# Patient Record
Sex: FEMALE | Race: WHITE | NOT HISPANIC OR LATINO | URBAN - METROPOLITAN AREA
[De-identification: names, ages, dates, MRNs, and addresses within clinical notes are randomized per-mention and may not be internally consistent; named-entity substitution may affect disease eponyms.]

---

## 2021-01-04 ENCOUNTER — OUT OF OFFICE VISIT (OUTPATIENT)
Dept: URBAN - METROPOLITAN AREA MEDICAL CENTER 10 | Facility: MEDICAL CENTER | Age: 51
End: 2021-01-04
Payer: COMMERCIAL

## 2021-01-04 DIAGNOSIS — R19.7 ACUTE DIARRHEA: ICD-10-CM

## 2021-01-04 DIAGNOSIS — B37.81 CANDIDA ESOPHAGITIS: ICD-10-CM

## 2021-01-04 DIAGNOSIS — R14.3 EXCESSIVE FLATUS: ICD-10-CM

## 2021-01-04 DIAGNOSIS — R93.3 ABN FINDINGS-GI TRACT: ICD-10-CM

## 2021-01-04 DIAGNOSIS — R11.2 ACUTE NAUSEA WITH NONBILIOUS VOMITING: ICD-10-CM

## 2021-01-04 PROCEDURE — 99231 SBSQ HOSP IP/OBS SF/LOW 25: CPT | Performed by: INTERNAL MEDICINE

## 2021-01-04 PROCEDURE — 99223 1ST HOSP IP/OBS HIGH 75: CPT | Performed by: INTERNAL MEDICINE

## 2021-01-04 PROCEDURE — G8427 DOCREV CUR MEDS BY ELIG CLIN: HCPCS | Performed by: INTERNAL MEDICINE

## 2021-01-05 ENCOUNTER — OUT OF OFFICE VISIT (OUTPATIENT)
Dept: URBAN - METROPOLITAN AREA MEDICAL CENTER 10 | Facility: MEDICAL CENTER | Age: 51
End: 2021-01-05
Payer: COMMERCIAL

## 2021-01-05 DIAGNOSIS — R14.0 ABDOMINAL BLOATING: ICD-10-CM

## 2021-01-05 DIAGNOSIS — R93.3 ABN FINDINGS-GI TRACT: ICD-10-CM

## 2021-01-05 DIAGNOSIS — B37.81 CANDIDA ESOPHAGITIS: ICD-10-CM

## 2021-01-05 PROCEDURE — 43239 EGD BIOPSY SINGLE/MULTIPLE: CPT | Performed by: INTERNAL MEDICINE

## 2021-01-05 PROCEDURE — 45330 DIAGNOSTIC SIGMOIDOSCOPY: CPT | Performed by: INTERNAL MEDICINE

## 2021-01-28 ENCOUNTER — OUT OF OFFICE VISIT (OUTPATIENT)
Dept: URBAN - METROPOLITAN AREA MEDICAL CENTER 10 | Facility: MEDICAL CENTER | Age: 51
End: 2021-01-28
Payer: COMMERCIAL

## 2021-01-28 DIAGNOSIS — R10.13 ABDOMINAL DISCOMFORT, EPIGASTRIC: ICD-10-CM

## 2021-01-28 DIAGNOSIS — R93.3 ABN FINDINGS-GI TRACT: ICD-10-CM

## 2021-01-28 DIAGNOSIS — R11.2 ACUTE NAUSEA WITH NONBILIOUS VOMITING: ICD-10-CM

## 2021-01-28 DIAGNOSIS — Z98.84 BARIATRIC SURG STAT-UNSP: ICD-10-CM

## 2021-01-28 PROCEDURE — G8427 DOCREV CUR MEDS BY ELIG CLIN: HCPCS | Performed by: INTERNAL MEDICINE

## 2021-01-28 PROCEDURE — 99223 1ST HOSP IP/OBS HIGH 75: CPT | Performed by: INTERNAL MEDICINE

## 2021-01-29 ENCOUNTER — OUT OF OFFICE VISIT (OUTPATIENT)
Dept: URBAN - METROPOLITAN AREA MEDICAL CENTER 10 | Facility: MEDICAL CENTER | Age: 51
End: 2021-01-29
Payer: COMMERCIAL

## 2021-01-29 DIAGNOSIS — R10.84 ABDOMINAL CRAMPING, GENERALIZED: ICD-10-CM

## 2021-01-29 DIAGNOSIS — K59.09 CHRONIC CONSTIPATION: ICD-10-CM

## 2021-01-29 DIAGNOSIS — R93.3 ABN FINDINGS-GI TRACT: ICD-10-CM

## 2021-01-29 PROCEDURE — 99232 SBSQ HOSP IP/OBS MODERATE 35: CPT | Performed by: INTERNAL MEDICINE

## 2021-01-30 ENCOUNTER — OUT OF OFFICE VISIT (OUTPATIENT)
Dept: URBAN - METROPOLITAN AREA MEDICAL CENTER 10 | Facility: MEDICAL CENTER | Age: 51
End: 2021-01-30
Payer: COMMERCIAL

## 2021-01-30 DIAGNOSIS — R93.3 ABN FINDINGS-GI TRACT: ICD-10-CM

## 2021-01-30 DIAGNOSIS — R10.84 ABDOMINAL CRAMPING, GENERALIZED: ICD-10-CM

## 2021-01-30 DIAGNOSIS — K59.09 CHRONIC CONSTIPATION: ICD-10-CM

## 2021-01-30 PROCEDURE — 99232 SBSQ HOSP IP/OBS MODERATE 35: CPT | Performed by: INTERNAL MEDICINE

## 2021-02-01 ENCOUNTER — OFFICE VISIT (OUTPATIENT)
Dept: URBAN - NONMETROPOLITAN AREA CLINIC 4 | Facility: CLINIC | Age: 51
End: 2021-02-01

## 2021-02-01 RX ORDER — PANTOPRAZOLE SODIUM 40 MG/1
TAKE 1 TABLET (40 MG) BY ORAL ROUTE ONCE DAILY TABLET, DELAYED RELEASE ORAL 1
Qty: 0 | Refills: 0 | COMMUNITY
Start: 1900-01-01

## 2021-02-01 RX ORDER — VERAPAMIL HYDROCHLORIDE 120 MG/1
TABLET, FILM COATED ORAL
Qty: 0 | Refills: 0 | COMMUNITY
Start: 1900-01-01

## 2021-02-01 RX ORDER — TOPIRAMATE 50 MG/1
TABLET, FILM COATED ORAL
Qty: 0 | Refills: 0 | COMMUNITY
Start: 1900-01-01

## 2021-02-01 RX ORDER — CLONAZEPAM 1 MG/1
TABLET ORAL
Qty: 0 | Refills: 0 | COMMUNITY
Start: 1900-01-01

## 2021-02-01 RX ORDER — ELETRIPTAN HYDROBROMIDE 40 MG/1
TABLET, FILM COATED ORAL
Qty: 0 | Refills: 0 | COMMUNITY
Start: 1900-01-01

## 2021-02-01 RX ORDER — GABAPENTIN 300 MG/1
CAPSULE ORAL
Qty: 0 | Refills: 0 | COMMUNITY
Start: 1900-01-01

## 2021-02-18 ENCOUNTER — OUT OF OFFICE VISIT (OUTPATIENT)
Dept: URBAN - METROPOLITAN AREA MEDICAL CENTER 10 | Facility: MEDICAL CENTER | Age: 51
End: 2021-02-18
Payer: COMMERCIAL

## 2021-02-18 DIAGNOSIS — Z98.84 BARIATRIC SURG STAT-UNSP: ICD-10-CM

## 2021-02-18 DIAGNOSIS — R10.11 ABDOMINAL BURNING SENSATION IN RIGHT UPPER QUADRANT: ICD-10-CM

## 2021-02-18 DIAGNOSIS — R93.3 ABN FINDINGS-GI TRACT: ICD-10-CM

## 2021-02-18 DIAGNOSIS — R11.2 ACUTE NAUSEA WITH NONBILIOUS VOMITING: ICD-10-CM

## 2021-02-18 PROCEDURE — G8427 DOCREV CUR MEDS BY ELIG CLIN: HCPCS | Performed by: INTERNAL MEDICINE

## 2021-02-18 PROCEDURE — 99222 1ST HOSP IP/OBS MODERATE 55: CPT | Performed by: INTERNAL MEDICINE

## 2021-02-19 ENCOUNTER — OUT OF OFFICE VISIT (OUTPATIENT)
Dept: URBAN - METROPOLITAN AREA MEDICAL CENTER 10 | Facility: MEDICAL CENTER | Age: 51
End: 2021-02-19
Payer: COMMERCIAL

## 2021-02-19 DIAGNOSIS — R10.84 ABDOMINAL CRAMPING, GENERALIZED: ICD-10-CM

## 2021-02-19 DIAGNOSIS — R93.3 ABN FINDINGS-GI TRACT: ICD-10-CM

## 2021-02-19 DIAGNOSIS — R11.2 ACUTE NAUSEA WITH NONBILIOUS VOMITING: ICD-10-CM

## 2021-02-19 DIAGNOSIS — Z98.84 BARIATRIC SURG STAT-UNSP: ICD-10-CM

## 2021-02-19 PROCEDURE — 99232 SBSQ HOSP IP/OBS MODERATE 35: CPT | Performed by: INTERNAL MEDICINE

## 2021-02-20 ENCOUNTER — OUT OF OFFICE VISIT (OUTPATIENT)
Dept: URBAN - METROPOLITAN AREA MEDICAL CENTER 10 | Facility: MEDICAL CENTER | Age: 51
End: 2021-02-20
Payer: COMMERCIAL

## 2021-02-20 DIAGNOSIS — R93.3 ABN FINDINGS-GI TRACT: ICD-10-CM

## 2021-02-20 DIAGNOSIS — R10.84 ABDOMINAL CRAMPING, GENERALIZED: ICD-10-CM

## 2021-02-20 PROCEDURE — 99232 SBSQ HOSP IP/OBS MODERATE 35: CPT | Performed by: INTERNAL MEDICINE

## 2021-03-10 ENCOUNTER — OUT OF OFFICE VISIT (OUTPATIENT)
Dept: URBAN - METROPOLITAN AREA MEDICAL CENTER 10 | Facility: MEDICAL CENTER | Age: 51
End: 2021-03-10
Payer: COMMERCIAL

## 2021-03-10 DIAGNOSIS — R10.13 ABDOMINAL DISCOMFORT, EPIGASTRIC: ICD-10-CM

## 2021-03-10 DIAGNOSIS — R11.2 ACUTE NAUSEA WITH NONBILIOUS VOMITING: ICD-10-CM

## 2021-03-10 DIAGNOSIS — R93.3 ABN FINDINGS-GI TRACT: ICD-10-CM

## 2021-03-10 PROCEDURE — 99232 SBSQ HOSP IP/OBS MODERATE 35: CPT | Performed by: INTERNAL MEDICINE

## 2021-03-10 PROCEDURE — 99222 1ST HOSP IP/OBS MODERATE 55: CPT | Performed by: INTERNAL MEDICINE

## 2021-03-10 PROCEDURE — G8427 DOCREV CUR MEDS BY ELIG CLIN: HCPCS | Performed by: INTERNAL MEDICINE

## 2021-03-12 ENCOUNTER — OUT OF OFFICE VISIT (OUTPATIENT)
Dept: URBAN - METROPOLITAN AREA MEDICAL CENTER 10 | Facility: MEDICAL CENTER | Age: 51
End: 2021-03-12
Payer: COMMERCIAL

## 2021-03-12 DIAGNOSIS — R10.84 ABDOMINAL CRAMPING, GENERALIZED: ICD-10-CM

## 2021-03-12 DIAGNOSIS — R93.3 ABN FINDINGS-GI TRACT: ICD-10-CM

## 2021-03-12 DIAGNOSIS — R11.0 CHRONIC NAUSEA: ICD-10-CM

## 2021-03-12 PROCEDURE — 99233 SBSQ HOSP IP/OBS HIGH 50: CPT | Performed by: INTERNAL MEDICINE

## 2021-03-13 ENCOUNTER — OUT OF OFFICE VISIT (OUTPATIENT)
Dept: URBAN - METROPOLITAN AREA MEDICAL CENTER 10 | Facility: MEDICAL CENTER | Age: 51
End: 2021-03-13
Payer: COMMERCIAL

## 2021-03-13 DIAGNOSIS — R10.84 ABDOMINAL CRAMPING, GENERALIZED: ICD-10-CM

## 2021-03-13 DIAGNOSIS — R11.0 CHRONIC NAUSEA: ICD-10-CM

## 2021-03-13 DIAGNOSIS — R93.3 ABN FINDINGS-GI TRACT: ICD-10-CM

## 2021-03-13 PROCEDURE — 99232 SBSQ HOSP IP/OBS MODERATE 35: CPT | Performed by: INTERNAL MEDICINE

## 2021-03-15 ENCOUNTER — OFFICE VISIT (OUTPATIENT)
Dept: URBAN - METROPOLITAN AREA TELEHEALTH 2 | Facility: TELEHEALTH | Age: 51
End: 2021-03-15
Payer: COMMERCIAL

## 2021-03-15 DIAGNOSIS — D50.9 IRON DEFICIENCY ANEMIA: ICD-10-CM

## 2021-03-15 DIAGNOSIS — R10.9 ABDOMINAL PAIN: ICD-10-CM

## 2021-03-15 DIAGNOSIS — K56.7 ILEUS: ICD-10-CM

## 2021-03-15 DIAGNOSIS — D50.8 ANEMIA, DUE TO INADEQUATE IRON INTAKE: ICD-10-CM

## 2021-03-15 DIAGNOSIS — R10.84 ABDOMINAL CRAMPING, GENERALIZED: ICD-10-CM

## 2021-03-15 DIAGNOSIS — K59.01 SLOW TRANSIT CONSTIPATION: ICD-10-CM

## 2021-03-15 PROCEDURE — G8427 DOCREV CUR MEDS BY ELIG CLIN: HCPCS | Performed by: INTERNAL MEDICINE

## 2021-03-15 PROCEDURE — 99213 OFFICE O/P EST LOW 20 MIN: CPT | Performed by: INTERNAL MEDICINE

## 2021-03-15 RX ORDER — ELETRIPTAN HYDROBROMIDE 40 MG/1
TABLET, FILM COATED ORAL
Qty: 0 | Refills: 0 | Status: ACTIVE | COMMUNITY
Start: 1900-01-01

## 2021-03-15 RX ORDER — CLONAZEPAM 1 MG/1
TABLET ORAL
Qty: 0 | Refills: 0 | Status: ACTIVE | COMMUNITY
Start: 1900-01-01

## 2021-03-15 RX ORDER — METHYLNALTREXONE BROMIDE 8 MG/.4ML
AS DIRECTED INJECTION, SOLUTION SUBCUTANEOUS ONCE A DAY
Qty: 7 | Refills: 1 | OUTPATIENT
Start: 2021-03-15 | End: 2021-03-29

## 2021-03-15 RX ORDER — TOPIRAMATE 50 MG/1
TABLET, FILM COATED ORAL
Qty: 0 | Refills: 0 | Status: ACTIVE | COMMUNITY
Start: 1900-01-01

## 2021-03-15 RX ORDER — GABAPENTIN 300 MG/1
CAPSULE ORAL
Qty: 0 | Refills: 0 | Status: ACTIVE | COMMUNITY
Start: 1900-01-01

## 2021-03-15 RX ORDER — LINACLOTIDE 145 UG/1
1 CAPSULE AT LEAST 30 MINUTES BEFORE THE FIRST MEAL OF THE DAY ON AN EMPTY STOMACH CAPSULE, GELATIN COATED ORAL ONCE A DAY
Qty: 90 | Refills: 1 | OUTPATIENT
Start: 2021-03-15 | End: 2021-09-11

## 2021-03-15 RX ORDER — PANTOPRAZOLE SODIUM 40 MG/1
TAKE 1 TABLET (40 MG) BY ORAL ROUTE ONCE DAILY TABLET, DELAYED RELEASE ORAL 1
Qty: 0 | Refills: 0 | Status: ACTIVE | COMMUNITY
Start: 1900-01-01

## 2021-03-15 RX ORDER — VERAPAMIL HYDROCHLORIDE 120 MG/1
TABLET, FILM COATED ORAL
Qty: 0 | Refills: 0 | Status: ACTIVE | COMMUNITY
Start: 1900-01-01

## 2021-03-15 NOTE — HPI-OTHER HISTORIES
08/17/2015 The patient is a 45 year old /White female, who presents on referral from Bryce Byrne MD, for a gastroenterology evaluation for anemia. She was reports being diagnosed with iron deficiency 1 year ago when the patient presented with fatigue. Her anemia is moderate. The patient has not noted gastrointestinal bleeding. These symptoms have been present for 1 year and have worsened.  The pt reports no associated symptoms. The patient does not take medications which thin the blood or cause ulcers.  The patient has not undergone evaluation of the anemia. The patient has had endoscopic procedures. The endoscopic evaluation includes a colonoscopy, upper endoscopy, and capsule endoscopy. The colonoscopy was done 1 year ago. It showed those findings reviewed in the database of the EMR. The EGD was done WHAT DID THE EGD SHOW ago. It showed those findings which were reviewed and are in the data base of the EMR. The capsule study was done WHEN WAS THE CAPSULE It showed those findings which were reviewed and are in the database of the EMR in the WHAT WAS THE LOCATION.

## 2021-03-15 NOTE — HPI-TODAY'S VISIT:
03/15/2021 Patient is seen via telehealth. Patient says she has been in and out of the hospital (about 7 times) since last December due to internal hernia operation and colonic ileus afterwards. She has been seeing Dr. Raymond in [place]. She is currently on reglan, carafate, pantoprazole, Linzess 72mcg, dulcolax suppository, 5mg percocet (for pain). She does have some bowel movements, but they are irregular and very painful. No GI bleeding. Complaining for bloating as well. She says "her colon is full and bowel movements are mainly liquid." She was given relistor and movantik in the hospital once which did help her.

## 2021-03-16 ENCOUNTER — OUT OF OFFICE VISIT (OUTPATIENT)
Dept: URBAN - METROPOLITAN AREA MEDICAL CENTER 39 | Facility: MEDICAL CENTER | Age: 51
End: 2021-03-16
Payer: COMMERCIAL

## 2021-03-16 DIAGNOSIS — R10.13 ABDOMINAL DISCOMFORT, EPIGASTRIC: ICD-10-CM

## 2021-03-16 DIAGNOSIS — R19.7 ACUTE DIARRHEA: ICD-10-CM

## 2021-03-16 DIAGNOSIS — R11.2 ACUTE NAUSEA WITH NONBILIOUS VOMITING: ICD-10-CM

## 2021-03-16 PROCEDURE — 99233 SBSQ HOSP IP/OBS HIGH 50: CPT | Performed by: INTERNAL MEDICINE

## 2021-03-16 PROCEDURE — 99222 1ST HOSP IP/OBS MODERATE 55: CPT | Performed by: INTERNAL MEDICINE

## 2021-03-16 PROCEDURE — G8427 DOCREV CUR MEDS BY ELIG CLIN: HCPCS | Performed by: INTERNAL MEDICINE

## 2021-03-18 ENCOUNTER — TELEPHONE ENCOUNTER (OUTPATIENT)
Dept: URBAN - METROPOLITAN AREA CLINIC 98 | Facility: CLINIC | Age: 51
End: 2021-03-18

## 2021-03-18 ENCOUNTER — TELEPHONE ENCOUNTER (OUTPATIENT)
Dept: URBAN - METROPOLITAN AREA CLINIC 82 | Facility: CLINIC | Age: 51
End: 2021-03-18

## 2021-03-22 ENCOUNTER — DASHBOARD ENCOUNTERS (OUTPATIENT)
Age: 51
End: 2021-03-22

## 2021-03-22 ENCOUNTER — WEB ENCOUNTER (OUTPATIENT)
Dept: URBAN - NONMETROPOLITAN AREA CLINIC 4 | Facility: CLINIC | Age: 51
End: 2021-03-22

## 2021-03-22 ENCOUNTER — LAB OUTSIDE AN ENCOUNTER (OUTPATIENT)
Dept: URBAN - NONMETROPOLITAN AREA CLINIC 4 | Facility: CLINIC | Age: 51
End: 2021-03-22

## 2021-03-22 ENCOUNTER — OFFICE VISIT (OUTPATIENT)
Dept: URBAN - NONMETROPOLITAN AREA CLINIC 4 | Facility: CLINIC | Age: 51
End: 2021-03-22
Payer: COMMERCIAL

## 2021-03-22 VITALS
TEMPERATURE: 96.4 F | BODY MASS INDEX: 34.77 KG/M2 | DIASTOLIC BLOOD PRESSURE: 96 MMHG | HEIGHT: 68 IN | WEIGHT: 229.4 LBS | SYSTOLIC BLOOD PRESSURE: 157 MMHG | HEART RATE: 87 BPM

## 2021-03-22 DIAGNOSIS — D50.9 IRON DEFICIENCY ANEMIA: ICD-10-CM

## 2021-03-22 DIAGNOSIS — R10.9 ABDOMINAL PAIN: ICD-10-CM

## 2021-03-22 DIAGNOSIS — K56.7 ILEUS: ICD-10-CM

## 2021-03-22 DIAGNOSIS — K59.01 SLOW TRANSIT CONSTIPATION: ICD-10-CM

## 2021-03-22 PROBLEM — 35298007: Status: ACTIVE | Noted: 2021-03-15

## 2021-03-22 PROBLEM — 87522002 IRON DEFICIENCY ANEMIA: Status: ACTIVE | Noted: 2021-03-15

## 2021-03-22 PROCEDURE — G8417 CALC BMI ABV UP PARAM F/U: HCPCS | Performed by: INTERNAL MEDICINE

## 2021-03-22 PROCEDURE — G8427 DOCREV CUR MEDS BY ELIG CLIN: HCPCS | Performed by: INTERNAL MEDICINE

## 2021-03-22 PROCEDURE — 99214 OFFICE O/P EST MOD 30 MIN: CPT | Performed by: INTERNAL MEDICINE

## 2021-03-22 PROCEDURE — G9903 PT SCRN TBCO ID AS NON USER: HCPCS | Performed by: INTERNAL MEDICINE

## 2021-03-22 RX ORDER — CLONAZEPAM 1 MG/1
TABLET ORAL
Qty: 0 | Refills: 0 | COMMUNITY

## 2021-03-22 RX ORDER — ELETRIPTAN HYDROBROMIDE 40 MG/1
TABLET, FILM COATED ORAL
Qty: 0 | Refills: 0 | COMMUNITY

## 2021-03-22 RX ORDER — GABAPENTIN 300 MG/1
CAPSULE ORAL
Qty: 0 | Refills: 0 | COMMUNITY

## 2021-03-22 RX ORDER — TOPIRAMATE 50 MG/1
TABLET, FILM COATED ORAL
Qty: 0 | Refills: 0 | COMMUNITY

## 2021-03-22 RX ORDER — VERAPAMIL HYDROCHLORIDE 120 MG/1
TABLET, FILM COATED ORAL
Qty: 0 | Refills: 0 | COMMUNITY

## 2021-03-22 RX ORDER — PANTOPRAZOLE SODIUM 40 MG/1
TAKE 1 TABLET (40 MG) BY ORAL ROUTE ONCE DAILY TABLET, DELAYED RELEASE ORAL 1
Qty: 0 | Refills: 0 | COMMUNITY

## 2021-03-22 NOTE — HPI-TODAY'S VISIT:
03/15/2021 Patient is seen via telehealth. Patient says she has been in and out of the hospital (about 7 times) since last December due to internal hernia operation and colonic ileus afterwards. She has been seeing Dr. Raymond in [place]. She is currently on reglan, carafate, pantoprazole, Linzess 72mcg, dulcolax suppository, 5mg percocet (for pain). She does have some bowel movements, but they are irregular and very painful. No GI bleeding. Complaining for bloating as well. She says "her colon is full and bowel movements are mainly liquid." She was given relistor and movantik in the hospital once which did help her.   03/22/2021 Patient presents for a f/u office visit. She had post operative ileus after operation for internal hernia, done by Dr. Jackson at [place]. She was able to use Relistor, but she still c/o bowel movements of only liquid or mucus, no solid stool. She is still experiencing left sided abdominal pain and vomiting even while only drinking tea, and she was seen at [facility] for the past 2 nights. She is also taking Linzess 145mcg once a day, Reglan. CT scan in the hospital (last month) showed stool in the colon, and she was told at the hospital not take any colon prep as it would get rid of her good gut bacteria. She was told not to take hyoscyamine for the stomach cramps. She tried Tramadol in the past which helped slightly. She denies any history of thyroid problems.

## 2021-03-23 ENCOUNTER — OUT OF OFFICE VISIT (OUTPATIENT)
Dept: URBAN - METROPOLITAN AREA MEDICAL CENTER 10 | Facility: MEDICAL CENTER | Age: 51
End: 2021-03-23
Payer: COMMERCIAL

## 2021-03-23 DIAGNOSIS — R11.2 ACUTE NAUSEA WITH NONBILIOUS VOMITING: ICD-10-CM

## 2021-03-23 DIAGNOSIS — R93.3 ABN FINDINGS-GI TRACT: ICD-10-CM

## 2021-03-23 DIAGNOSIS — R10.84 ABDOMINAL CRAMPING, GENERALIZED: ICD-10-CM

## 2021-03-23 PROCEDURE — 99223 1ST HOSP IP/OBS HIGH 75: CPT | Performed by: INTERNAL MEDICINE

## 2021-03-23 PROCEDURE — 99233 SBSQ HOSP IP/OBS HIGH 50: CPT | Performed by: INTERNAL MEDICINE

## 2021-03-23 PROCEDURE — G8427 DOCREV CUR MEDS BY ELIG CLIN: HCPCS | Performed by: INTERNAL MEDICINE

## 2021-03-24 ENCOUNTER — OUT OF OFFICE VISIT (OUTPATIENT)
Dept: URBAN - METROPOLITAN AREA MEDICAL CENTER 10 | Facility: MEDICAL CENTER | Age: 51
End: 2021-03-24
Payer: COMMERCIAL

## 2021-03-24 DIAGNOSIS — K56.2 CECAL VOLVULUS: ICD-10-CM

## 2021-03-24 DIAGNOSIS — K59.39 DILATATION OF COLON: ICD-10-CM

## 2021-03-24 DIAGNOSIS — R93.3 ABN FINDINGS-GI TRACT: ICD-10-CM

## 2021-03-24 PROCEDURE — 45337 SIGMOIDOSCOPY & DECOMPRESS: CPT | Performed by: INTERNAL MEDICINE

## 2021-03-26 ENCOUNTER — OUT OF OFFICE VISIT (OUTPATIENT)
Dept: URBAN - METROPOLITAN AREA MEDICAL CENTER 10 | Facility: MEDICAL CENTER | Age: 51
End: 2021-03-26
Payer: COMMERCIAL

## 2021-03-26 DIAGNOSIS — R93.3 ABN FINDINGS-GI TRACT: ICD-10-CM

## 2021-03-26 DIAGNOSIS — R14.0 ABDOMINAL BLOATING: ICD-10-CM

## 2021-03-26 DIAGNOSIS — R10.84 ABDOMINAL CRAMPING, GENERALIZED: ICD-10-CM

## 2021-03-26 PROCEDURE — 99231 SBSQ HOSP IP/OBS SF/LOW 25: CPT | Performed by: INTERNAL MEDICINE

## 2021-06-22 ENCOUNTER — OUT OF OFFICE VISIT (OUTPATIENT)
Dept: URBAN - NONMETROPOLITAN AREA MEDICAL CENTER 3 | Facility: MEDICAL CENTER | Age: 51
End: 2021-06-22
Payer: COMMERCIAL

## 2021-06-22 DIAGNOSIS — K56.690 OTHER PARTIAL INTESTINAL OBSTRUCTION: ICD-10-CM

## 2021-06-22 DIAGNOSIS — R10.84 ABDOMINAL CRAMPING, GENERALIZED: ICD-10-CM

## 2021-06-22 PROCEDURE — 99232 SBSQ HOSP IP/OBS MODERATE 35: CPT | Performed by: INTERNAL MEDICINE

## 2021-06-22 PROCEDURE — G8427 DOCREV CUR MEDS BY ELIG CLIN: HCPCS | Performed by: INTERNAL MEDICINE

## 2021-06-22 PROCEDURE — 99222 1ST HOSP IP/OBS MODERATE 55: CPT | Performed by: INTERNAL MEDICINE

## 2021-06-24 ENCOUNTER — OUT OF OFFICE VISIT (OUTPATIENT)
Dept: URBAN - NONMETROPOLITAN AREA MEDICAL CENTER 3 | Facility: MEDICAL CENTER | Age: 51
End: 2021-06-24
Payer: COMMERCIAL

## 2021-06-24 DIAGNOSIS — K56.690 OTHER PARTIAL INTESTINAL OBSTRUCTION: ICD-10-CM

## 2021-06-24 DIAGNOSIS — R10.84 ABDOMINAL CRAMPING, GENERALIZED: ICD-10-CM

## 2021-06-24 DIAGNOSIS — R11.0 CHRONIC NAUSEA: ICD-10-CM

## 2021-06-24 PROCEDURE — 99232 SBSQ HOSP IP/OBS MODERATE 35: CPT | Performed by: INTERNAL MEDICINE

## 2021-08-07 ENCOUNTER — APPOINTMENT (OUTPATIENT)
Dept: CT IMAGING | Age: 51
DRG: 750 | End: 2021-08-07
Attending: EMERGENCY MEDICINE
Payer: COMMERCIAL

## 2021-08-07 ENCOUNTER — HOSPITAL ENCOUNTER (INPATIENT)
Age: 51
LOS: 6 days | Discharge: HOME | DRG: 750 | End: 2021-08-13
Attending: EMERGENCY MEDICINE | Admitting: PSYCHIATRY & NEUROLOGY
Payer: COMMERCIAL

## 2021-08-07 DIAGNOSIS — F23 ACUTE PSYCHOSIS (HCC): Primary | ICD-10-CM

## 2021-08-07 LAB
ALBUMIN SERPL-MCNC: 4.2 G/DL (ref 3.4–5)
ALBUMIN/GLOB SERPL: 0.9 {RATIO} (ref 0.8–1.7)
ALP SERPL-CCNC: 88 U/L (ref 45–117)
ALT SERPL-CCNC: 32 U/L (ref 13–56)
AMPHET UR QL SCN: NEGATIVE
ANION GAP SERPL CALC-SCNC: 4 MMOL/L (ref 3–18)
APPEARANCE UR: ABNORMAL
AST SERPL-CCNC: 22 U/L (ref 10–38)
ATRIAL RATE: 70 BPM
BACTERIA URNS QL MICRO: ABNORMAL /HPF
BARBITURATES UR QL SCN: NEGATIVE
BASOPHILS # BLD: 0.1 K/UL (ref 0–0.1)
BASOPHILS NFR BLD: 1 % (ref 0–2)
BENZODIAZ UR QL: NEGATIVE
BILIRUB SERPL-MCNC: 0.6 MG/DL (ref 0.2–1)
BILIRUB UR QL: NEGATIVE
BUN SERPL-MCNC: 7 MG/DL (ref 7–18)
BUN/CREAT SERPL: 10 (ref 12–20)
CALCIUM SERPL-MCNC: 9.5 MG/DL (ref 8.5–10.1)
CALCULATED P AXIS, ECG09: 30 DEGREES
CALCULATED R AXIS, ECG10: -5 DEGREES
CALCULATED T AXIS, ECG11: 34 DEGREES
CANNABINOIDS UR QL SCN: NEGATIVE
CHLORIDE SERPL-SCNC: 105 MMOL/L (ref 100–111)
CO2 SERPL-SCNC: 28 MMOL/L (ref 21–32)
COCAINE UR QL SCN: NEGATIVE
COLOR UR: YELLOW
COVID-19 RAPID TEST, COVR: NOT DETECTED
CREAT SERPL-MCNC: 0.7 MG/DL (ref 0.6–1.3)
DIAGNOSIS, 93000: NORMAL
DIFFERENTIAL METHOD BLD: ABNORMAL
EOSINOPHIL # BLD: 0.1 K/UL (ref 0–0.4)
EOSINOPHIL NFR BLD: 1 % (ref 0–5)
EPITH CASTS URNS QL MICRO: ABNORMAL /LPF (ref 0–5)
ERYTHROCYTE [DISTWIDTH] IN BLOOD BY AUTOMATED COUNT: 15.7 % (ref 11.6–14.5)
ETHANOL SERPL-MCNC: <3 MG/DL (ref 0–3)
GLOBULIN SER CALC-MCNC: 4.6 G/DL (ref 2–4)
GLUCOSE SERPL-MCNC: 111 MG/DL (ref 74–99)
GLUCOSE UR STRIP.AUTO-MCNC: NEGATIVE MG/DL
HCG UR QL: NEGATIVE
HCT VFR BLD AUTO: 47.1 % (ref 35–45)
HDSCOM,HDSCOM: NORMAL
HGB BLD-MCNC: 14.9 G/DL (ref 12–16)
HGB UR QL STRIP: NEGATIVE
KETONES UR QL STRIP.AUTO: NEGATIVE MG/DL
LEUKOCYTE ESTERASE UR QL STRIP.AUTO: ABNORMAL
LYMPHOCYTES # BLD: 1.4 K/UL (ref 0.9–3.6)
LYMPHOCYTES NFR BLD: 16 % (ref 21–52)
MCH RBC QN AUTO: 25.3 PG (ref 24–34)
MCHC RBC AUTO-ENTMCNC: 31.6 G/DL (ref 31–37)
MCV RBC AUTO: 80.1 FL (ref 74–97)
METHADONE UR QL: NEGATIVE
MONOCYTES # BLD: 0.6 K/UL (ref 0.05–1.2)
MONOCYTES NFR BLD: 6 % (ref 3–10)
NEUTS SEG # BLD: 6.6 K/UL (ref 1.8–8)
NEUTS SEG NFR BLD: 76 % (ref 40–73)
NITRITE UR QL STRIP.AUTO: NEGATIVE
OPIATES UR QL: NEGATIVE
P-R INTERVAL, ECG05: 140 MS
PCP UR QL: NEGATIVE
PH UR STRIP: 5 [PH] (ref 5–8)
PLATELET # BLD AUTO: 299 K/UL (ref 135–420)
PMV BLD AUTO: 8.8 FL (ref 9.2–11.8)
POTASSIUM SERPL-SCNC: 3.6 MMOL/L (ref 3.5–5.5)
PROT SERPL-MCNC: 8.8 G/DL (ref 6.4–8.2)
PROT UR STRIP-MCNC: NEGATIVE MG/DL
Q-T INTERVAL, ECG07: 402 MS
QRS DURATION, ECG06: 72 MS
QTC CALCULATION (BEZET), ECG08: 434 MS
RBC # BLD AUTO: 5.88 M/UL (ref 4.2–5.3)
SODIUM SERPL-SCNC: 137 MMOL/L (ref 136–145)
SOURCE, COVRS: NORMAL
SP GR UR REFRACTOMETRY: 1.01 (ref 1–1.03)
TSH SERPL DL<=0.05 MIU/L-ACNC: 2.61 UIU/ML (ref 0.36–3.74)
UROBILINOGEN UR QL STRIP.AUTO: 0.2 EU/DL (ref 0.2–1)
VENTRICULAR RATE, ECG03: 70 BPM
WBC # BLD AUTO: 8.8 K/UL (ref 4.6–13.2)
WBC URNS QL MICRO: ABNORMAL /HPF (ref 0–4)

## 2021-08-07 PROCEDURE — 74011250637 HC RX REV CODE- 250/637: Performed by: STUDENT IN AN ORGANIZED HEALTH CARE EDUCATION/TRAINING PROGRAM

## 2021-08-07 PROCEDURE — 84443 ASSAY THYROID STIM HORMONE: CPT

## 2021-08-07 PROCEDURE — 87635 SARS-COV-2 COVID-19 AMP PRB: CPT

## 2021-08-07 PROCEDURE — 80307 DRUG TEST PRSMV CHEM ANLYZR: CPT

## 2021-08-07 PROCEDURE — 65220000003 HC RM SEMIPRIVATE PSYCH

## 2021-08-07 PROCEDURE — 99285 EMERGENCY DEPT VISIT HI MDM: CPT

## 2021-08-07 PROCEDURE — 81025 URINE PREGNANCY TEST: CPT

## 2021-08-07 PROCEDURE — 74011250637 HC RX REV CODE- 250/637: Performed by: EMERGENCY MEDICINE

## 2021-08-07 PROCEDURE — 70450 CT HEAD/BRAIN W/O DYE: CPT

## 2021-08-07 PROCEDURE — 74011250637 HC RX REV CODE- 250/637: Performed by: PSYCHIATRY & NEUROLOGY

## 2021-08-07 PROCEDURE — 93005 ELECTROCARDIOGRAM TRACING: CPT

## 2021-08-07 PROCEDURE — 82077 ASSAY SPEC XCP UR&BREATH IA: CPT

## 2021-08-07 PROCEDURE — 85025 COMPLETE CBC W/AUTO DIFF WBC: CPT

## 2021-08-07 PROCEDURE — 80053 COMPREHEN METABOLIC PANEL: CPT

## 2021-08-07 PROCEDURE — 81001 URINALYSIS AUTO W/SCOPE: CPT

## 2021-08-07 RX ORDER — CLONIDINE HYDROCHLORIDE 0.1 MG/1
0.2 TABLET ORAL
Status: COMPLETED | OUTPATIENT
Start: 2021-08-07 | End: 2021-08-07

## 2021-08-07 RX ORDER — CEPHALEXIN 250 MG/1
500 CAPSULE ORAL EVERY 6 HOURS
Status: CANCELLED | OUTPATIENT
Start: 2021-08-07

## 2021-08-07 RX ORDER — TRAZODONE HYDROCHLORIDE 50 MG/1
25 TABLET ORAL
Status: DISCONTINUED | OUTPATIENT
Start: 2021-08-07 | End: 2021-08-10

## 2021-08-07 RX ORDER — HYDRALAZINE HYDROCHLORIDE 25 MG/1
25 TABLET, FILM COATED ORAL 3 TIMES DAILY
Status: CANCELLED | OUTPATIENT
Start: 2021-08-07

## 2021-08-07 RX ORDER — HYDRALAZINE HYDROCHLORIDE 25 MG/1
25 TABLET, FILM COATED ORAL 3 TIMES DAILY
Status: DISCONTINUED | OUTPATIENT
Start: 2021-08-07 | End: 2021-08-13 | Stop reason: HOSPADM

## 2021-08-07 RX ORDER — HALOPERIDOL 2 MG/1
2 TABLET ORAL
Status: DISCONTINUED | OUTPATIENT
Start: 2021-08-07 | End: 2021-08-13 | Stop reason: HOSPADM

## 2021-08-07 RX ORDER — LORAZEPAM 0.5 MG/1
0.5 TABLET ORAL
Status: DISCONTINUED | OUTPATIENT
Start: 2021-08-07 | End: 2021-08-13 | Stop reason: HOSPADM

## 2021-08-07 RX ORDER — CEPHALEXIN 250 MG/1
500 CAPSULE ORAL 4 TIMES DAILY
Status: DISCONTINUED | OUTPATIENT
Start: 2021-08-07 | End: 2021-08-07

## 2021-08-07 RX ORDER — CLONIDINE HYDROCHLORIDE 0.1 MG/1
0.2 TABLET ORAL
Status: DISCONTINUED | OUTPATIENT
Start: 2021-08-07 | End: 2021-08-07 | Stop reason: SDUPTHER

## 2021-08-07 RX ORDER — NITROFURANTOIN 25; 75 MG/1; MG/1
100 CAPSULE ORAL 2 TIMES DAILY
Status: COMPLETED | OUTPATIENT
Start: 2021-08-07 | End: 2021-08-12

## 2021-08-07 RX ORDER — CEPHALEXIN 250 MG/1
500 CAPSULE ORAL EVERY 6 HOURS
Status: DISCONTINUED | OUTPATIENT
Start: 2021-08-07 | End: 2021-08-07

## 2021-08-07 RX ADMIN — CLONIDINE HYDROCHLORIDE 0.2 MG: 0.1 TABLET ORAL at 19:54

## 2021-08-07 RX ADMIN — NITROFURANTOIN MONOHYDRATE/MACROCRYSTALLINE 100 MG: 25; 75 CAPSULE ORAL at 21:28

## 2021-08-07 RX ADMIN — HYDRALAZINE HYDROCHLORIDE 25 MG: 25 TABLET, FILM COATED ORAL at 19:24

## 2021-08-07 RX ADMIN — HYDRALAZINE HYDROCHLORIDE 25 MG: 25 TABLET, FILM COATED ORAL at 08:26

## 2021-08-07 RX ADMIN — CEPHALEXIN 500 MG: 250 CAPSULE ORAL at 08:27

## 2021-08-07 RX ADMIN — TRAZODONE HYDROCHLORIDE 25 MG: 50 TABLET ORAL at 22:29

## 2021-08-07 RX ADMIN — CEPHALEXIN 500 MG: 250 CAPSULE ORAL at 19:24
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
## 2021-08-07 NOTE — ED TRIAGE NOTES
Patient A/O x 4, presented to the ED form mental health evaluation. Patient states, \"My family think I have some mental issues. I'm here to be tested from rattle snake poisoning\". Patient denies SI/HI.
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
## 2021-08-07 NOTE — ED NOTES
3:45 PM patient signed out to me at 7 AM by Dr. Tony Hebert patient with a first-time psychotic break and UTI, pending assessment by crisis. At this time crisis is no other evaluation they would like some medication given to bring the blood pressure back to normal range. We will treat the UTI. They would like CAT scan as the patient is having a first-time psychotic break at age 49.   Will be signed out to Dr. Soraya Nichols

## 2021-08-08 PROBLEM — I10 ESSENTIAL HYPERTENSION: Chronic | Status: ACTIVE | Noted: 2021-08-08

## 2021-08-08 PROBLEM — F20.0 PARANOID SCHIZOPHRENIA (HCC): Chronic | Status: ACTIVE | Noted: 2021-08-07

## 2021-08-08 PROBLEM — Z15.89 SCHIZOTAXIA: Chronic | Status: ACTIVE | Noted: 2021-08-07

## 2021-08-08 PROBLEM — N39.0 UTI (URINARY TRACT INFECTION): Status: ACTIVE | Noted: 2021-08-08

## 2021-08-08 PROCEDURE — 65220000003 HC RM SEMIPRIVATE PSYCH

## 2021-08-08 PROCEDURE — 99222 1ST HOSP IP/OBS MODERATE 55: CPT | Performed by: PSYCHIATRY & NEUROLOGY

## 2021-08-08 PROCEDURE — 74011250637 HC RX REV CODE- 250/637: Performed by: STUDENT IN AN ORGANIZED HEALTH CARE EDUCATION/TRAINING PROGRAM

## 2021-08-08 PROCEDURE — 74011250637 HC RX REV CODE- 250/637: Performed by: PSYCHIATRY & NEUROLOGY

## 2021-08-08 PROCEDURE — 74011250637 HC RX REV CODE- 250/637: Performed by: PHYSICIAN ASSISTANT

## 2021-08-08 PROCEDURE — 74011250637 HC RX REV CODE- 250/637: Performed by: EMERGENCY MEDICINE

## 2021-08-08 RX ORDER — RISPERIDONE 1 MG/1
1 TABLET, FILM COATED ORAL
Status: DISCONTINUED | OUTPATIENT
Start: 2021-08-08 | End: 2021-08-09

## 2021-08-08 RX ORDER — TRIAMCINOLONE ACETONIDE 5 MG/G
CREAM TOPICAL 2 TIMES DAILY
Status: DISCONTINUED | OUTPATIENT
Start: 2021-08-08 | End: 2021-08-13 | Stop reason: HOSPADM

## 2021-08-08 RX ADMIN — RISPERIDONE 1 MG: 1 TABLET ORAL at 20:02

## 2021-08-08 RX ADMIN — HYDRALAZINE HYDROCHLORIDE 25 MG: 25 TABLET, FILM COATED ORAL at 08:33

## 2021-08-08 RX ADMIN — TRAZODONE HYDROCHLORIDE 25 MG: 50 TABLET ORAL at 21:02

## 2021-08-08 RX ADMIN — NITROFURANTOIN MONOHYDRATE/MACROCRYSTALLINE 100 MG: 25; 75 CAPSULE ORAL at 17:30

## 2021-08-08 RX ADMIN — TRIAMCINOLONE ACETONIDE: 5 CREAM TOPICAL at 20:03

## 2021-08-08 RX ADMIN — HYDRALAZINE HYDROCHLORIDE 25 MG: 25 TABLET, FILM COATED ORAL at 16:25

## 2021-08-08 RX ADMIN — NITROFURANTOIN MONOHYDRATE/MACROCRYSTALLINE 100 MG: 25; 75 CAPSULE ORAL at 08:11

## 2021-08-08 RX ADMIN — HYDRALAZINE HYDROCHLORIDE 25 MG: 25 TABLET, FILM COATED ORAL at 21:02
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
## 2021-08-08 NOTE — ED NOTES
Patient for admission to behavioral medicine report given BP now 128/93 patient given Nitrofurantoin  For UTI.

## 2021-08-08 NOTE — BSMART NOTE
Crisis Note: Dr. Lazara Leone updated re: CT results and B/P reading; admission orders received; report given to unit nurse, Bety Michel.

## 2021-08-08 NOTE — ED NOTES
Patient brought to a mendoza bed from the waiting room give BP medications will continue to monitor.
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
## 2021-08-08 NOTE — BH NOTES
Group Therapy Note    Patient: Amanda Garcia    Patient # in Group: 9    Group Type: Leisure-Creative Group    Group Time: 30 minutes    Method used: Free discussion    Attendance: Amanda Garcia was      compliant with group and participated fully for 100% of the duration. Interaction Narrative: Amanda Garcia had a Depressed mood, was Withdrawn during group and Sherine Leone thought content was Organized. Writer Reinforced patient's understanding of how leisure activities and stressors from life and diagnosis-related can be lessened. Patient was Able to listen to others, Able to reflect/comment on own behavior and Able to receive feedback. Will continue to monitor and provide redirection, education, and support as needed.

## 2021-08-08 NOTE — BH NOTES
Pt very paranoid and disorganized. Hallucinated that she was incontinent of urine in bed. Bed was clean, dry, and intact. Disturbed roommate throughout the morning due to insomnia and disorganization. Will continue to monitor for safety and re-orient to reality.

## 2021-08-08 NOTE — PROGRESS NOTES
Problem: Psychosis  Goal: *STG: Decreased delusional thinking  Description: AEB observing a marked decrease tor absence in delusional thinking prior to discharge from hospital.  Outcome: Progressing Towards Goal  Goal: *STG: Remains safe in hospital  Description: AEB remaining safe and free from harm during hospitalization  Outcome: Progressing Towards Goal  Goal: *STG/LTG: Complies with medication therapy  Description: AEB taking medication as prescribed daily while hospitalized. Outcome: Progressing Towards Goal  Goal: *STG/LTG: Demonstrates improved thought patterns as evidenced by logical and coherent speech  Description: AEB observing an improvement in thought patterns as evidenced by logical and coherent speech within at least 3 days of admission. Outcome: Progressing Towards Goal     Elicia Nuno is a 49 y.o. Female that presented today as flat, depressed, but cooperative with staff. Elicia Nuno has been compliant with medications, has eaten all of meals offered, and has attended groups. RN's will initiate, develop, implement, review, and revise treatment plans as necessary. Will continue to provide education, redirection, and support as needed or verbalized by patient.    Signed By: Clemente Forrest RN     August 8, 2021

## 2021-08-09 PROCEDURE — 74011250637 HC RX REV CODE- 250/637: Performed by: EMERGENCY MEDICINE

## 2021-08-09 PROCEDURE — 74011250637 HC RX REV CODE- 250/637: Performed by: PSYCHIATRY & NEUROLOGY

## 2021-08-09 PROCEDURE — 74011250637 HC RX REV CODE- 250/637: Performed by: STUDENT IN AN ORGANIZED HEALTH CARE EDUCATION/TRAINING PROGRAM

## 2021-08-09 PROCEDURE — 65220000003 HC RM SEMIPRIVATE PSYCH

## 2021-08-09 RX ORDER — VENLAFAXINE HYDROCHLORIDE 75 MG/1
75 CAPSULE, EXTENDED RELEASE ORAL
Status: DISCONTINUED | OUTPATIENT
Start: 2021-08-10 | End: 2021-08-11

## 2021-08-09 RX ORDER — RISPERIDONE 2 MG/1
2 TABLET, FILM COATED ORAL
Status: DISCONTINUED | OUTPATIENT
Start: 2021-08-09 | End: 2021-08-13 | Stop reason: HOSPADM

## 2021-08-09 RX ADMIN — TRIAMCINOLONE ACETONIDE: 5 CREAM TOPICAL at 20:34

## 2021-08-09 RX ADMIN — RISPERIDONE 2 MG: 2 TABLET ORAL at 20:31

## 2021-08-09 RX ADMIN — NITROFURANTOIN MONOHYDRATE/MACROCRYSTALLINE 100 MG: 25; 75 CAPSULE ORAL at 17:00

## 2021-08-09 RX ADMIN — TRAZODONE HYDROCHLORIDE 25 MG: 50 TABLET ORAL at 21:00

## 2021-08-09 RX ADMIN — HYDRALAZINE HYDROCHLORIDE 25 MG: 25 TABLET, FILM COATED ORAL at 20:31

## 2021-08-09 RX ADMIN — HYDRALAZINE HYDROCHLORIDE 25 MG: 25 TABLET, FILM COATED ORAL at 08:04

## 2021-08-09 RX ADMIN — HYDRALAZINE HYDROCHLORIDE 25 MG: 25 TABLET, FILM COATED ORAL at 15:32

## 2021-08-09 RX ADMIN — NITROFURANTOIN MONOHYDRATE/MACROCRYSTALLINE 100 MG: 25; 75 CAPSULE ORAL at 08:04
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
## 2021-08-09 NOTE — PROGRESS NOTES
[address]  Inpatient Progress Note     Date of Service: 08/09/21  Hospital Day: 2     Subjective/Interval History   08/09/21    Treatment Team Notes:  Notes reviewed and/or discussed and report that Odell Garcia is a 58-year-old female admitted for psychosis. No acute events overnight. Patient interview: Odell Garcia was interviewed by this writer today. We reviewed events leading to admission. Patient reports recently moving to [place] about a week ago from [facility]. She admits to being worried about her current state of unemployment and limited finances as well as lack of health insurance. However she did not have health insurance when she was in [facility]. 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 The patient has been preoccupied that there is snake venom in her blood and on her clothes and that there is something wrong with the security system in her brother's house where she lives. She feels that there are snakes in the security system. This does not seem to be a new delusion as she reports that while she was in [facility] she still had the same thoughts about the security system in her parent's house. Objective     Visit Vitals  /85   Pulse 86   Temp 97.3 °F (36.3 °C)   Resp 16   Ht 5' 2\" (1.575 m)   Wt 93 kg (205 lb)   SpO2 98%   BMI 37.49 kg/m²       No results found for this or any previous visit (from the past 24 hour(s)). Mental Status Examination     Appearance/Hygiene 49 y.o.  WHITE/NON- female  Hygiene: Fair   Behavior/Social Relatedness Appropriate   Musculoskeletal Gait/Station: appropriate  Tone (flaccid, cogwheeling, spastic): not assessed  Psychomotor (hyperkinetic, hypokinetic): calm   Involuntary movements (tics, dyskinesias, akathisa, stereotypies): none   Speech   slow speech, slightly increased latency   Mood   sad and anxious   Affect    congruent   Thought Process Linear and goal directed   Thought Content and Perceptual Disturbances Denies self-injurious behavior (SIB), suicidal ideation (SI), aggressive behavior or homicidal ideation (HI)    Denies auditory and visual hallucinations   Sensorium and Cognition  Grossly intact   Insight  Limited   Judgment  Limited        Assessment/Plan      Psychiatric Diagnoses:   Patient Active Problem List   Diagnosis Code    Paranoid schizophrenia ([facility] [address] F20.0    UTI (urinary tract infection) N39.0    Essential hypertension I10       Level of impairment/disability: Severe Genevieve Marlin is a 49 y.o. who is currently admitted for acute psychosis and is not doing well. 1.  Increase risperidone to 2 mg at bedtime for psychosis. Add Effexor XR 75 mg daily depression and anxiety  2. Reviewed instructions, risks, benefits and side effects of medications  3. Disposition/Discharge Date: self-care/home, to be determined.     Donald Up MD [facility]. [facility]  Psychiatry

## 2021-08-09 NOTE — GROUP NOTE
LYNNETTE  GROUP DOCUMENTATION INDIVIDUAL                                                                          Group Therapy Note    Date: 8/8/2021    Group Start Time: 2000  Group End Time: 2030  Group Topic: Nursing    [facility] [address]MARY ANNE Caban, [address] [place] [address], RN    IP [facility] GROUP DOCUMENTATION GROUP    Group Therapy Note    Attendees: 9         Attendance: Attended    Patient's Goal:  Understanding medication being provided and daily reflection. Interventions/techniques: Informed, Validated, Provide feedback and Reinforced    Follows Directions: Followed directions    Interactions: Interacted appropriately    Mental Status: Flat    Behavior/appearance: Cooperative    Goals Achieved: Able to reflect/comment on own behavior and Able to receive feedback      Additional Notes:  Pt has been isolated to self in day area. Education received on medication being provided. Pt voiced understanding. Compliant with medication. Followed directions. Intrusive at times, but easily redirectable. Flat affect. Pt provided with an opportunity to reflect on day and was slow to respond and denied any issues, comments, or concerns. Denied SI/HI or auditory/visual hallucinations at present. Will continue to monitor and support as needed.      Bennett Cruz RN

## 2021-08-09 NOTE — PROGRESS NOTES
Problem: Falls - Risk of  Goal: *Absence of Falls  Description: Document Paul Ibanez Fall Risk and appropriate interventions in the flowsheet. AEB remaining free of falls and wearing skid proof socks daily while hospitalized. Outcome: Progressing Towards Goal  Note: Fall Risk Interventions:            Medication Interventions: Teach patient to arise slowly         Problem: Psychosis  Goal: *STG: Remains safe in hospital  Description: AEB remaining safe and free from harm during hospitalization  Outcome: Progressing Towards Goal  Goal: *STG/LTG: Complies with medication therapy  Description: AEB taking medication as prescribed daily while hospitalized. Outcome: Progressing Towards Goal       Offers no c/o si/hi avh. Has been observed in the day area for the shift, mostly quiet and to herself. Has taken scheduled medications, eaten meals/snacks and attended groups.   Will continue to support

## 2021-08-09 NOTE — BH NOTES
Pt calm and cooperative. Has spent most of her free time in the day area watching TV. Very little interaction with staff or peers unless approached. Still hypertensive. Odd behavior, still believes there are alarms that emit snake venom at her house. Stated that her sheets were wet; however, they were not but changed anyway. Pt was not wet. Apparently this happen last night as well. Compliant with meds. Has not required PRNs. Will continue to monitor for safety.

## 2021-08-09 NOTE — BSMART NOTE
SOCIAL WORK GROUP THERAPY PROGRESS NOTE    Group Time:   9:30am    Group Topic:  Coping Skills    C D Issues    Group Participation:  . Pt minimally involved during group discussion & at times difficult to assess how focused she was. Affect flat mood dysphoric & seemed possibly responding to internal stimuli. \"Seven Steps\" for taking responsibility for our Happiness was reviewed including commitment to change, self-care, setting limits, goal setting & letting go. Pt had no input. Also reviewed strategies to keep a \"Journal\" for moods, cognition & behavior. Same as above as she mostly looked around & only shrugged shoulders when support offered.

## 2021-08-09 NOTE — BH NOTES
The writer has observed the patient's behavior throughout this shift ([id number]). During this shift patient has been calm and cooperative. Patient displays a sad and depressed appearance and a dull and flat affect. Patient has been a little active as she withdraws herself from peers when sitting out in the milieu. In addition, patient was able to attend group sessions and to eat breakfast and lunch that was offered. Will continue to monitor the patient's safety and safety locations.
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
## 2021-08-10 PROCEDURE — 74011250637 HC RX REV CODE- 250/637: Performed by: PSYCHIATRY & NEUROLOGY

## 2021-08-10 PROCEDURE — 74011250637 HC RX REV CODE- 250/637: Performed by: EMERGENCY MEDICINE

## 2021-08-10 PROCEDURE — 99232 SBSQ HOSP IP/OBS MODERATE 35: CPT | Performed by: PSYCHIATRY & NEUROLOGY

## 2021-08-10 PROCEDURE — 65220000003 HC RM SEMIPRIVATE PSYCH

## 2021-08-10 PROCEDURE — 74011250637 HC RX REV CODE- 250/637: Performed by: STUDENT IN AN ORGANIZED HEALTH CARE EDUCATION/TRAINING PROGRAM

## 2021-08-10 RX ORDER — IBUPROFEN 400 MG/1
400 TABLET ORAL
Status: DISCONTINUED | OUTPATIENT
Start: 2021-08-10 | End: 2021-08-13 | Stop reason: HOSPADM

## 2021-08-10 RX ORDER — TRAZODONE HYDROCHLORIDE 50 MG/1
50 TABLET ORAL
Status: DISCONTINUED | OUTPATIENT
Start: 2021-08-10 | End: 2021-08-13 | Stop reason: HOSPADM

## 2021-08-10 RX ADMIN — HYDRALAZINE HYDROCHLORIDE 25 MG: 25 TABLET, FILM COATED ORAL at 08:05

## 2021-08-10 RX ADMIN — VENLAFAXINE HYDROCHLORIDE 75 MG: 75 CAPSULE, EXTENDED RELEASE ORAL at 08:11

## 2021-08-10 RX ADMIN — HYDRALAZINE HYDROCHLORIDE 25 MG: 25 TABLET, FILM COATED ORAL at 15:52

## 2021-08-10 RX ADMIN — IBUPROFEN 400 MG: 400 TABLET, FILM COATED ORAL at 17:58

## 2021-08-10 RX ADMIN — NITROFURANTOIN MONOHYDRATE/MACROCRYSTALLINE 100 MG: 25; 75 CAPSULE ORAL at 17:51

## 2021-08-10 RX ADMIN — TRIAMCINOLONE ACETONIDE: 5 CREAM TOPICAL at 08:07

## 2021-08-10 RX ADMIN — HYDRALAZINE HYDROCHLORIDE 25 MG: 25 TABLET, FILM COATED ORAL at 21:27

## 2021-08-10 RX ADMIN — RISPERIDONE 2 MG: 2 TABLET ORAL at 20:05

## 2021-08-10 RX ADMIN — NITROFURANTOIN MONOHYDRATE/MACROCRYSTALLINE 100 MG: 25; 75 CAPSULE ORAL at 08:05

## 2021-08-10 NOTE — PROGRESS NOTES
[address] [facility] [address]  Inpatient Progress Note     Date of Service: 08/10/21  Hospital Day: 3     Subjective/Interval History   08/10/21    Treatment Team Notes:  Notes reviewed and/or discussed and report that Elicia Nuno is a 60-year-old female admitted for psychosis. No acute events overnight. Patient slept 5 hours last night. Nurse reports patient has increased speech latency and appears to have some thought blocking. Patient interview: Elicia Nuno was interviewed by this writer today. Her speech was still soft with low volume but appropriate latency. She responded to questions appropriately. She reported feeling slightly better today and described her mood as \"neutral\". She denies suicidal or homicidal ideations or auditory or visual hallucinations. She complains of not sleeping well and feeling tired this morning. She also continues to feel paranoid about the food. She says she had one type of breakfast because she was \"finding stuff\" in her food. Nurse reports that she ate 75% of lunch this afternoon. She was not asked about snake venom and she did not talk about it. Her affect was brighter and she had appropriate smiling. She denied any issues with risperidone. Objective     Visit Vitals  /80   Pulse 93   Temp 96.9 °F (36.1 °C)   Resp 16   Ht 5' 2\" (1.575 m)   Wt 93 kg (205 lb)   SpO2 98%   BMI 37.49 kg/m²       No results found for this or any previous visit (from the past 24 hour(s)). Mental Status Examination     Appearance/Hygiene 49 y.o.  WHITE/NON- female  Hygiene: Fair   Behavior/Social Relatedness Appropriate   Musculoskeletal Gait/Station: appropriate  Tone (flaccid, cogwheeling, spastic): not assessed  Psychomotor (hyperkinetic, hypokinetic): calm   Involuntary movements (tics, dyskinesias, akathisa, stereotypies): none   Speech   Soft speech, normal latency   Mood   \"neutral\"   Affect    congruent   Thought Process Linear and goal directed Thought Content and Perceptual Disturbances Denies self-injurious behavior (SIB), suicidal ideation (SI), aggressive behavior or homicidal ideation (HI)    Denies auditory and visual hallucinations   Sensorium and Cognition  Grossly intact   Insight  Limited   Judgment  Limited        Assessment/Plan      Psychiatric Diagnoses:   Patient Active Problem List   Diagnosis Code    Paranoid schizophrenia ([facility] [address] F20.0    UTI (urinary tract infection) N39.0    Essential hypertension I10       Level of impairment/disability: Severe Suann Hood is a 49 y.o. who is currently admitted for acute psychosis and is not doing well. 1.  Continue risperidone 2 mg at bedtime for psychosis and Effexor XR 75 mg daily for depression and anxiety. Increase trazodone to 50 mg at bedtime as needed for insomnia. 2.  Reviewed instructions, risks, benefits and side effects of medications  3. Disposition/Discharge Date: self-care/home, to be determined.     MD DR. LU[facility] [place]  Psychiatry

## 2021-08-10 NOTE — BSMART NOTE
Social Work Group Progress Note    Time: 115    Attendance:  Full     Participation Level:  Engaged     Observation: Patient did engage in group process.   Patient was able to disclose and did complete task at hand,

## 2021-08-10 NOTE — BSMART NOTE
ART THERAPY GROUP PROGRESS NOTE    PATIENT SCHEDULED FOR GROUP AT: 200    ATTENDANCE: Full    PARTICIPATION LEVEL: Participates fully in the art process    ATTENTION LEVEL: Able to focus on task    FOCUS: Goals    SYMBOLIC & THEMATIC CONTENT AS NOTED IN IMAGERY: She was calm, compliant, and invested in the task. She claimed that she tends to struggle with negative thinking and \"Fears the unknown. \" She discussed areas in her life that she would like to work on and nurture, such as forgiveness, positive thinking, and lillie.

## 2021-08-10 NOTE — BSMART NOTE
ART THERAPY GROUP PROGRESS NOTE    PATIENT SCHEDULED FOR GROUP AT: 5500    ATTENDANCE: Full    PARTICIPATION LEVEL:Participates fully in the art process    ATTENTION LEVEL: Able to focus on task    FOCUS: Mindfulness    SYMBOLIC & THEMATIC CONTENT AS NOTED IN IMAGERY: She was calm, compliant, and invested in the task. She claimed that she wanted to focus \"alignment\" in her life, both mentally and physically.

## 2021-08-10 NOTE — PROGRESS NOTES
Problem: Falls - Risk of  Goal: *Absence of Falls  Description: Document Fariha Radford Fall Risk and appropriate interventions in the flowsheet. AEB remaining free of falls and wearing skid proof socks daily while hospitalized. Outcome: Progressing Towards Goal  Note: Fall Risk Interventions:            Medication Interventions: Teach patient to arise slowly                   Problem: Psychosis  Goal: *STG: Decreased delusional thinking  Description: AEB observing a marked decrease tor absence in delusional thinking prior to discharge from hospital.  Outcome: Progressing Towards Goal  Goal: *STG: Remains safe in hospital  Description: AEB remaining safe and free from harm during hospitalization  Outcome: Progressing Towards Goal  Goal: *STG/LTG: Complies with medication therapy  Description: AEB taking medication as prescribed daily while hospitalized. Outcome: Progressing Towards Goal   Patient observed sitting in day area starring into space. She is having some thought blocking, poor eye contact. She is complaint with her medications. Withdrawn, denies SI, denies auditory and visual hallucinations. Patient states she does not believe that snake venom is under her skin.
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
## 2021-08-10 NOTE — GROUP NOTE
LYNNETTE  GROUP DOCUMENTATION INDIVIDUAL                                                                          Group Therapy Note    Date: 8/10/2021    Group Start Time: 1400  Group End Time: 9760  Group Topic: Nursing    [facility] SPECIAL TRTMT Enrrique Durbin [address], RN    IP [address] [facility] GROUP DOCUMENTATION GROUP    Group Therapy Note    Attendees: 1         Attendance: Attended    Patient's Goal:  Coping Skills    Interventions/techniques: Informed    Follows Directions:  Followed directions    Interactions: Interacted appropriately    Mental Status: Calm    Behavior/appearance: Cooperative    Goals Achieved: Able to engage in interactions        Freddy Wilson RN

## 2021-08-11 LAB
ATRIAL RATE: 67 BPM
CALCULATED P AXIS, ECG09: 68 DEGREES
CALCULATED R AXIS, ECG10: 59 DEGREES
CALCULATED T AXIS, ECG11: 43 DEGREES
DIAGNOSIS, 93000: NORMAL
P-R INTERVAL, ECG05: 144 MS
Q-T INTERVAL, ECG07: 394 MS
QRS DURATION, ECG06: 76 MS
QTC CALCULATION (BEZET), ECG08: 416 MS
VENTRICULAR RATE, ECG03: 67 BPM

## 2021-08-11 PROCEDURE — 74011250637 HC RX REV CODE- 250/637: Performed by: PHYSICIAN ASSISTANT

## 2021-08-11 PROCEDURE — 65220000003 HC RM SEMIPRIVATE PSYCH

## 2021-08-11 PROCEDURE — 93005 ELECTROCARDIOGRAM TRACING: CPT

## 2021-08-11 PROCEDURE — 74011250637 HC RX REV CODE- 250/637: Performed by: STUDENT IN AN ORGANIZED HEALTH CARE EDUCATION/TRAINING PROGRAM

## 2021-08-11 PROCEDURE — 74011250637 HC RX REV CODE- 250/637: Performed by: PSYCHIATRY & NEUROLOGY

## 2021-08-11 PROCEDURE — 74011250637 HC RX REV CODE- 250/637: Performed by: EMERGENCY MEDICINE

## 2021-08-11 RX ORDER — CLONIDINE HYDROCHLORIDE 0.1 MG/1
0.1 TABLET ORAL
Status: DISCONTINUED | OUTPATIENT
Start: 2021-08-11 | End: 2021-08-13 | Stop reason: HOSPADM

## 2021-08-11 RX ADMIN — NITROFURANTOIN MONOHYDRATE/MACROCRYSTALLINE 100 MG: 25; 75 CAPSULE ORAL at 08:06

## 2021-08-11 RX ADMIN — CLONIDINE HYDROCHLORIDE 0.1 MG: 0.1 TABLET ORAL at 20:51

## 2021-08-11 RX ADMIN — HYDRALAZINE HYDROCHLORIDE 25 MG: 25 TABLET, FILM COATED ORAL at 22:22

## 2021-08-11 RX ADMIN — RISPERIDONE 2 MG: 2 TABLET ORAL at 20:52

## 2021-08-11 RX ADMIN — TRIAMCINOLONE ACETONIDE 15 G: 5 CREAM TOPICAL at 20:50

## 2021-08-11 RX ADMIN — VENLAFAXINE HYDROCHLORIDE 75 MG: 75 CAPSULE, EXTENDED RELEASE ORAL at 08:06

## 2021-08-11 RX ADMIN — TRAZODONE HYDROCHLORIDE 50 MG: 50 TABLET ORAL at 20:51

## 2021-08-11 RX ADMIN — HYDRALAZINE HYDROCHLORIDE 25 MG: 25 TABLET, FILM COATED ORAL at 16:52

## 2021-08-11 RX ADMIN — NITROFURANTOIN MONOHYDRATE/MACROCRYSTALLINE 100 MG: 25; 75 CAPSULE ORAL at 18:00

## 2021-08-11 RX ADMIN — HYDRALAZINE HYDROCHLORIDE 25 MG: 25 TABLET, FILM COATED ORAL at 08:06

## 2021-08-11 NOTE — BSMART NOTE
Social Work Group Progress Note    Time: 130    Attendance:  Full     Participation Level: Engaged     Observation: Patient did engaged in group. Patient was quiet however, was able to completed worksheet provided.

## 2021-08-11 NOTE — BH NOTES
Presently the patient's b/p manually is 152/98. Her pulse is 92. She still denies headache, blurred vision or dizziness. Continuing to monitor. Will report all changes.
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
## 2021-08-11 NOTE — BSMART NOTE
ART THERAPY GROUP PROGRESS NOTE    PATIENT SCHEDULED FOR GROUP AT: 4700    ATTENDANCE: Full    PARTICIPATION LEVEL: Participates fully in the art process    ATTENTION LEVEL : Able to focus on task    FOCUS: Mindfulness    SYMBOLIC & THEMATIC CONTENT AS NOTED IN IMAGERY: She was calm, compliant, and invested in the task at hand. She claimed that she wants to focus on the words \"courage and confidence\" today and in treatment.

## 2021-08-11 NOTE — BH NOTES
On 8/10/21 during the 3-11 pm shift, patient spent the majority of this shift in the milieu. Patient asked to go outside for recreation and then changed her mind. Patient is quiet the majority of the shift besides asking this writer to switch the channel on the TV. However, patient does speak when spoken to. Patient was sitting quietly with her head down on the table. Patient alerted the nurse that her head does hurt really bad. Patient picked through her dinner and ate about a quarter of her food. Despite this writers encouragement to eat snacks during snack time or have something to drink, patient refused. Patient was standing by the garbage and lost her balance a little but did not fall. Staff assisted patient to her room to lie in bed. The nurse is aware. This writer will continue to monitor patient for safety.

## 2021-08-11 NOTE — BSMART NOTE
ART THERAPY GROUP PROGRESS NOTE    PATIENT SCHEDULED FOR GROUP AT: 7943    ATTENDANCE: Full    PARTICIPATION LEVEL: Participates fully in the art process    ATTENTION LEVEL : Able to focus on task    FOCUS: Positive affirmations    SYMBOLIC & THEMATIC CONTENT AS NOTED IN IMAGERY: She was calm, compliant, and invested in the task at hand. Her approach to task was planned-out and purposeful. Associations were relevant and rational. She claimed that she needs to look at her mistakes as learning opportunities vs failures and reasons to feel defeated.

## 2021-08-11 NOTE — GROUP NOTE
LYNNETTE  GROUP DOCUMENTATION INDIVIDUAL                                                                          Group Therapy Note    Date: 8/11/2021    Group Start Time: 1630  Group End Time: 6627  Group Topic: Nursing    [facility] SPECIAL TRT 1    Gema Higginbotham, JONNIE    IP [address] [facility] GROUP DOCUMENTATION GROUP    Group Therapy Note    Attendees: 6         Attendance: Attended    Patient's Goal:  General Directions    Interventions/techniques: Informed    Follows Directions:  Followed directions    Interactions: Interacted appropriately    Mental Status: Calm    Behavior/appearance: Cooperative    Goals Achieved: Able to listen to others        Sofi Estrada RN

## 2021-08-11 NOTE — PROGRESS NOTES
conducted an Spirituality Group for Naeem Cowart, who is a 49 y.o.,female. Patient's Primary Language is: [place]. According to the patient's EMR Congregation Affiliation is: Atheist.     The reason the Patient came to the hospital is:   Patient Active Problem List    Diagnosis Date Noted    UTI (urinary tract infection) 08/08/2021    Essential hypertension 08/08/2021    Paranoid schizophrenia ([facility] [address] 08/07/2021         conducted Spirituality Group for \"[employer]\" (Ex. 14) who was one of four participants. The  provided the following Interventions:  Continued the relationship of care and support. Listened empathically. Offered prayer and assurance of continued prayer on patients behalf. Chart reviewed. The following outcomes were achieved:  Patient expressed gratitude for 's visit. Assessment:  There are no further spiritual or Anglican issues which require Spiritual Care Services interventions at this time. Plan:  Chaplains will continue to follow and will provide pastoral care on an as needed/requested basis.  recommends bedside caregivers page  on duty if patient shows signs of acute spiritual or emotional distress.        [facility]   [phone]

## 2021-08-11 NOTE — GROUP NOTE
LYNNETTE  GROUP DOCUMENTATION INDIVIDUAL                                                                          Group Therapy Note    Date: 8/11/2021    Group Start Time: 1400  Group End Time: 2996  Group Topic: Nursing    [facility] SPECIAL TRTMT Enrrique Durbin [address], RN    IP [address] [facility] GROUP DOCUMENTATION GROUP    Group Therapy Note    Attendees: 3         Attendance: Attended    Patient's Goal:  Self Care worksheet    Interventions/techniques: Informed    Follows Directions:  Followed directions    Interactions: Interacted appropriately    Mental Status: Calm    Behavior/appearance: Cooperative    Goals Achieved: Able to listen to others          Mariluz Clemens RN

## 2021-08-11 NOTE — BH NOTES
The patient is up on the unit. Her B/P is elevated according to the automatic cuff, her b/p  Reading is 173/107. Pulse 95 Resp 18 Temp 97.8. The patient denies headache, dizziness or blurred vision.

## 2021-08-11 NOTE — PROGRESS NOTES
[address] [facility] [address]  Inpatient Progress Note     Date of Service: 08/11/21  Hospital Day: 4     Subjective/Interval History   08/11/21    Treatment Team Notes:  Notes reviewed and/or discussed and report that Odell Garcia is a 23-year-old female admitted for psychosis. No acute events overnight. Nurse reports pt has been engaging in appropriate conversation and has been on the milieu watching TV and attending group therapy sessions. Patient interview: Odell Gacria was interviewed by this writer today. Pt denied complaints today and said she was feeling better. She denied feeling like there was snake venom in her blood and said she had never thought there was snake venom in her blood although this is documented in her H&P. She denied feeling like there were snakes around her. She denied hallucinations. She still reported decreased food intake due to thinking that the food had things in it that were not supposed to be there. Earlier in the week she had said she saw snake's fangs in the food but today she said she didn't see snake fangs but felt she saw white things that were not supposed to be there. She is sleeping fairly well and tolerating current medication regimen. Her BP has been elevated today. I am unsure if this is due to Effexor as she is on a low dose. Will continue to monitor. EKG done this morning and reviewed.     Objective     Visit Vitals  BP (!) 156/98 (BP 1 Location: Left lower arm, BP Patient Position: Sitting)   Pulse 91   Temp (!) 96.5 °F (35.8 °C)   Resp 18   Ht 5' 2\" (1.575 m)   Wt 93 kg (205 lb)   SpO2 100%   BMI 37.49 kg/m²       Recent Results (from the past 24 hour(s))   EKG, 12 LEAD, INITIAL    Collection Time: 08/11/21 11:01 AM   Result Value Ref Range    Ventricular Rate 67 BPM    Atrial Rate 67 BPM    P-R Interval 144 ms    QRS Duration 76 ms    Q-T Interval 394 ms    QTC Calculation (Bezet) 416 ms    Calculated P Axis 68 degrees    Calculated R Axis 59 degrees    Calculated T Axis 43 degrees    Diagnosis       Normal sinus rhythm  Normal ECG  When compared with ECG of 07-AUG-2021 04:53,  Questionable change in QRS axis  Confirmed by Elijah Yañez MD, Chuck Solano ([id number]) on 8/11/2021 12:25:53 PM         Mental Status Examination     Appearance/Hygiene 49 y.o. WHITE/NON- female  Hygiene: Fair   Behavior/Social Relatedness Appropriate   Musculoskeletal Gait/Station: appropriate  Tone (flaccid, cogwheeling, spastic): not assessed  Psychomotor (hyperkinetic, hypokinetic): calm   Involuntary movements (tics, dyskinesias, akathisa, stereotypies): none   Speech   Soft speech, normal latency   Mood   \"neutral\"   Affect    congruent   Thought Process Linear and goal directed   Thought Content and Perceptual Disturbances Denies self-injurious behavior (SIB), suicidal ideation (SI), aggressive behavior or homicidal ideation (HI)    Denies auditory and visual hallucinations   Sensorium and Cognition  Grossly intact   Insight  Limited   Judgment Fair        Assessment/Plan      Psychiatric Diagnoses:   Patient Active Problem List   Diagnosis Code    Paranoid schizophrenia ([facility] [address] F20.0    UTI (urinary tract infection) N39.0    Essential hypertension I10       Level of impairment/disability: Severe Walden Scrape is a 49 y.o. who is currently admitted for acute psychosis who is doing better. 1) Schizophrenia: Continue Risperidone 2mg QHS. Continue group and individual therapy on the milieu. 2) HTN: BP is elevated. Will d/c Effexor. Add Clonidine 0.1mg TID prn SBP >160 or DBP > 90.    3) UTI: Continue Macrobid 100mg BID. Pt is currently asymptomatic.           Lety Corey MD DR. [facility]  Psychiatry

## 2021-08-11 NOTE — PROGRESS NOTES
Problem: Falls - Risk of  Goal: *Absence of Falls  Description: Document Izard Pillow Fall Risk and appropriate interventions in the flowsheet. AEB remaining free of falls and wearing skid proof socks daily while hospitalized. Outcome: Progressing Towards Goal  Note: Fall Risk Interventions:            Medication Interventions: Teach patient to arise slowly                   Problem: Psychosis  Goal: *STG: Decreased delusional thinking  Description: AEB observing a marked decrease tor absence in delusional thinking prior to discharge from hospital.  Outcome: Progressing Towards Goal  Goal: *STG/LTG: Complies with medication therapy  Description: AEB taking medication as prescribed daily while hospitalized. Outcome: Progressing Towards Goal  Goal: *STG/LTG: Demonstrates improved thought patterns as evidenced by logical and coherent speech  Description: AEB observing an improvement in thought patterns as evidenced by logical and coherent speech within at least 3 days of admission. Outcome: Progressing Towards Goal  Goal: *STG/LTG: Demonstrates improved social functioning by responding appropriately to staff  Description: Torstenma Nate demonstrating improved social functioning by responding appropriately to staff after at least 3 days of admission. Outcome: Progressing Towards Goal   Patient's has improved with her thought process, conversation is logical and she is responding appropriately. She is attending group and is engaged in activity.

## 2021-08-11 NOTE — MED STUDENT NOTES
*ATTENTION:  This note has been created by a medical student for educational purposes only. Please do not refer to the content of this note for clinical decision-making, billing, or other purposes. Please see attending physicians note to obtain clinical information on this patient. *          [address]  Inpatient Progress Note      Date of Service: 08/11/21  Hospital Day: 4     SubSubjective/Interval History  08/11/21      Treatment Team Notes:  Notes reviewed and/or discussed and report that Elda Shelton is a 60-year-old female admitted for psychosis. No acute events overnight. Patient slept 7 hours last night. Nurse reports patient has continued speech latency and appears to have some thought blocking. Patient interview: Elda Shelton was interviewed by this writer today. Her speech was still soft with low volume but appropriate latency. She responded to questions appropriately. Last night, she experienced an acute episode of high BP. She reported feeling slightly better today and described her mood as \"fine/neutral\". She denies suicidal or homicidal ideations or auditory or visual hallucinations. She complains of not sleeping well and feeling tired this morning. She also continues to feel paranoid about the food. She says she had one type of breakfast because she was \"finding white stuff\" in her food. She was not asked about snake venom and she did not talk about it. Her affect was less bright than yesterday and she smiled less than yesterday. She denied any issues with risperidone. Objective    Visit Vitals  /91   Pulse 77   Temp 97.0 °F (36.1 °C)   Resp 18   Ht 5' 2\" (1.575 m)   Wt 93 kg (205 lb)   SpO2 N/A   BMI 37.49 kg/m²     Mental Status Examination  Appearance/Hygiene 49 y.o.  WHITE/NON- female  Hygiene: Fair   Behavior/Social Relatedness Appropriate   Musculoskeletal Gait/Station: appropriate  Tone (flaccid, cogwheeling, spastic): not assessed  Psychomotor (hyperkinetic, hypokinetic): calm   Involuntary movements (tics, dyskinesias, akathisa, stereotypies): none   Speech                Soft speech, normal latency   Mood                \"neutral\"   Affect                               congruent   Thought Process Linear and goal directed   Thought Content and Perceptual Disturbances Denies self-injurious behavior (SIB), suicidal ideation (SI), aggressive behavior or homicidal ideation (HI)     Denies auditory and visual hallucinations   Sensorium and Cognition    Grossly intact   Insight                Limited   Judgment  Limited         Assessment/Plan      Psychiatric Diagnoses:   Patient Active Problem List   Diagnosis Code    Paranoid schizophrenia ([facility] [address] F20.0    UTI (urinary tract infection) N39.0    Essential hypertension I10         Level of impairment/disability: Severe Verlon Naas is a 49 y.o. who is currently admitted for acute psychosis and is not doing well.     1. Continue risperidone 2 mg at bedtime for psychosis and Effexor XR 75 mg daily for depression and anxiety. Increase trazodone to 50 mg at bedtime as needed for insomnia. 2. Venlafaxine discussed with pt.  3.  Reviewed instructions, risks, benefits and side effects of medications  4.   Disposition/Discharge Date: self-care/home, to be determined.     Antoinette Leal, [address] [facility]  Psychiatry

## 2021-08-11 NOTE — BH NOTES
The patient has been up in the milieu all evening. She has been watching television. She has been medication compliant. She is alert and orientated to time, place and situation. She contracts for safety. Will continue to monitor. Will continue to provide support and will report any changes.

## 2021-08-12 PROCEDURE — 65220000003 HC RM SEMIPRIVATE PSYCH

## 2021-08-12 PROCEDURE — 74011250637 HC RX REV CODE- 250/637: Performed by: STUDENT IN AN ORGANIZED HEALTH CARE EDUCATION/TRAINING PROGRAM

## 2021-08-12 PROCEDURE — 74011250637 HC RX REV CODE- 250/637: Performed by: PSYCHIATRY & NEUROLOGY

## 2021-08-12 PROCEDURE — 74011250637 HC RX REV CODE- 250/637: Performed by: EMERGENCY MEDICINE

## 2021-08-12 PROCEDURE — 99231 SBSQ HOSP IP/OBS SF/LOW 25: CPT | Performed by: PSYCHIATRY & NEUROLOGY

## 2021-08-12 PROCEDURE — 74011250637 HC RX REV CODE- 250/637: Performed by: PHYSICIAN ASSISTANT

## 2021-08-12 RX ORDER — RISPERIDONE 2 MG/1
2 TABLET, FILM COATED ORAL
Qty: 30 TABLET | Refills: 0 | Status: SHIPPED | OUTPATIENT
Start: 2021-08-12

## 2021-08-12 RX ADMIN — HYDRALAZINE HYDROCHLORIDE 25 MG: 25 TABLET, FILM COATED ORAL at 16:51

## 2021-08-12 RX ADMIN — RISPERIDONE 2 MG: 2 TABLET ORAL at 20:15

## 2021-08-12 RX ADMIN — CLONIDINE HYDROCHLORIDE 0.1 MG: 0.1 TABLET ORAL at 00:39

## 2021-08-12 RX ADMIN — NITROFURANTOIN MONOHYDRATE/MACROCRYSTALLINE 100 MG: 25; 75 CAPSULE ORAL at 08:03

## 2021-08-12 RX ADMIN — HYDRALAZINE HYDROCHLORIDE 25 MG: 25 TABLET, FILM COATED ORAL at 08:03

## 2021-08-12 RX ADMIN — HYDRALAZINE HYDROCHLORIDE 25 MG: 25 TABLET, FILM COATED ORAL at 21:19

## 2021-08-12 RX ADMIN — TRIAMCINOLONE ACETONIDE: 5 CREAM TOPICAL at 08:00

## 2021-08-12 NOTE — PROGRESS NOTES
Problem: Falls - Risk of  Goal: *Absence of Falls  Description: Document Ana Sharma Fall Risk and appropriate interventions in the flowsheet. AEB remaining free of falls and wearing skid proof socks daily while hospitalized. Outcome: Progressing Towards Goal  Note: Fall Risk Interventions:            Medication Interventions: Teach patient to arise slowly                   Problem: Psychosis  Goal: *STG: Decreased delusional thinking  Description: AEB observing a marked decrease tor absence in delusional thinking prior to discharge from hospital.  Outcome: Progressing Towards Goal  Goal: *STG: Remains safe in hospital  Description: AEB remaining safe and free from harm during hospitalization  Outcome: Progressing Towards Goal  Goal: *STG/LTG: Complies with medication therapy  Description: AEB taking medication as prescribed daily while hospitalized. Outcome: Progressing Towards Goal   Patient denies SI, denies AV. She is attending her groups. Medication compliant.  Thoughts are organized and logical.

## 2021-08-12 NOTE — GROUP NOTE
LYNNETTE  GROUP DOCUMENTATION INDIVIDUAL                                                                          Group Therapy Note    Date: 8/12/2021    Group Start Time: 2952  Group End Time: 1430  Group Topic: Nursing    [facility] SPECIAL TRTMT Enrrique Curtis, RN    IP [address] [facility] GROUP DOCUMENTATION GROUP    Group Therapy Note    Attendees: 1         Attendance: Attended    Patient's Goal:  Worksheet \" Triggers\". Interventions/techniques: Informed    Follows Directions:  Followed directions    Interactions: Interacted appropriately    Mental Status:    Behavior/appearance: Cooperative    Goals Achieved: Able to listen to others          Jose Angel Funes RN

## 2021-08-12 NOTE — BSMART NOTE
SOCIAL WORK GROUP THERAPY PROGRESS NOTE    Group Time:  9:30am    Group Topic:  Coping Skills    C D Issues    Group Participation:      Pt moderately involved during group discussion & remained attentive, even reading parts of handout. Not as dysphoric but still guarded with flat affect. Discussion included the process of making \"Change\" by answering   Analyzing probability of feared event, potential consequences & how to manage. Pt acknowledged she needs to work on asking for help & to think more positive. Differences between Assertive, Aggressive & Non-Assertive Behaviors also were covered.

## 2021-08-12 NOTE — PROGRESS NOTES
[address] [facility] [address]  Inpatient Progress Note     Date of Service: 08/12/21  Hospital Day: 5     Subjective/Interval History   08/12/21    Treatment Team Notes:  Notes reviewed and/or discussed and report that Shanita Frey is a 64-year-old female admitted for psychosis. No acute events overnight. Nurse reports pt has been engaging in appropriate conversation and has been on the milieu watching TV and attending group therapy sessions. Her thought process has been linear and organized. Patient interview: Shanita Frey was interviewed by this writer today during treatment team.  Patient denied complaints. She denied hallucinations or paranoia. She did not voice any delusions. She denied feeling like there was a snake venom in her blood. She denied tactile hallucinations. She denied feeling that there was anything in her food. She says she ate most of her breakfast this morning. Her mood is neutral.  She is tolerating risperidone without any adverse reaction or noticeable side effects. She is sleeping well. The patient feels that she is ready for discharge. She has been attending groups and participating appropriately. Her thought process has been linear and goal-directed. No evidence of psychosis in the past 2 days. Patient was educated on the need to adhere to medication regimen and outpatient follow-up post discharge. Objective     Visit Vitals  /81 (BP 1 Location: Left upper arm, BP Patient Position: Sitting)   Pulse 90   Temp 96.8 °F (36 °C)   Resp 18   Ht 5' 2\" (1.575 m)   Wt 93 kg (205 lb)   SpO2 100%   BMI 37.49 kg/m²       No results found for this or any previous visit (from the past 24 hour(s)). Mental Status Examination     Appearance/Hygiene 49 y.o.  WHITE/NON- female  Hygiene: Fair   Behavior/Social Relatedness Appropriate   Musculoskeletal Gait/Station: appropriate  Tone (flaccid, cogwheeling, spastic): not assessed  Psychomotor (hyperkinetic, hypokinetic): calm   Involuntary movements (tics, dyskinesias, akathisa, stereotypies): none   Speech   Soft speech, normal latency   Mood   \"neutral\"   Affect    congruent   Thought Process Linear and goal directed   Thought Content and Perceptual Disturbances Denies self-injurious behavior (SIB), suicidal ideation (SI), aggressive behavior or homicidal ideation (HI)    Denies auditory and visual hallucinations   Sensorium and Cognition  Grossly intact   Insight  Limited   Judgment Fair        Assessment/Plan      Psychiatric Diagnoses:   Patient Active Problem List   Diagnosis Code    Paranoid schizophrenia ([facility] [address] F20.0    UTI (urinary tract infection) N39.0    Essential hypertension I10       Level of impairment/disability: Severe Parnell Bosworth is a 49 y.o. who is currently admitted for acute psychosis who is doing better. 1) Schizophrenia: Continue Risperidone 2mg QHS. Continue group and individual therapy on the milieu. 2) HTN: BP is better today. Continue hydralazine 25 mg 3 times a day. 3) UTI: Patient has completed 5 days of Macrobid. She is asymptomatic. 4) Discharge plan is for patient to be discharged tomorrow back to her brother's house where she came from.         Luke Garcia MD [facility]  Psychiatry

## 2021-08-12 NOTE — BH NOTES
Pt has reported elevated BP on previous shift. BP remains elevated at 153/104 pulse 79 respiration 16. BP taken with 2 cuffs for comparison. She was medicated with clonidine 0.1 mg. Po. She remains quiet and speaks in low tone.

## 2021-08-12 NOTE — BSMART NOTE
Social Work Group Progress Note    Time: 130    Attendance:  Full     Participation Level: Engaged     Observation: Patient is engaged in group session and has completed task at hand. Patient is able to disclose and addressed concerns.

## 2021-08-12 NOTE — BSMART NOTE
ART THERAPY GROUP PROGRESS NOTE    PATIENT SCHEDULED FOR GROUP AT: 3448    ATTENDANCE: Full    PARTICIPATION LEVEL: Participates fully in the art process    ATTENTION LEVEL : Able to focus on task    FOCUS: Mindfulness    SYMBOLIC & THEMATIC CONTENT AS NOTED IN IMAGERY: She was calm, compliant, and invested in the task at hand. Her approach was planned-out and she participated in group discussion. Associations are relevant and goal oriented.

## 2021-08-12 NOTE — BSMART NOTE
ART THERAPY GROUP PROGRESS NOTE    PATIENT SCHEDULED FOR GROUP AT: 200    ATTENDANCE: Full    PARTICIPATION LEVEL:  Participates fully in the art process    ATTENTION LEVEL : Able to focus on task    FOCUS: Reality orientation     SYMBOLIC & THEMATIC CONTENT AS NOTED IN IMAGERY: She was calm, compliant, and invested in the task at hand.  Her approach to task was planned out and purposeful and imagery and associations were relevant and rational.
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
## 2021-08-12 NOTE — PROGRESS NOTES
Treatment team met -     Medical Director: _____present   Psychiatrist: __x___present   Charge nurse: _x____present   MSW: __x___present   : _____present   Nurse Manager: _____present   Student RNs: _____present   Medical Students: _____present   Art Therapist: _____present   Clinical Coordinator: __x___present    Occupational Therapist: _____present   : _______ present    _______ present  Crisis Supervisor_______present  Patient:____x___ present      Plan of care discussed and updated as appropriate. Patient was very soft spoken, stated her mood and thoughts were better. Patient denied having thoughts/ feelings of snake venom being in her blood or clothes. Patient been med compliant and going to groups. Discussed discharge plans.

## 2021-08-13 VITALS
RESPIRATION RATE: 18 BRPM | WEIGHT: 205 LBS | DIASTOLIC BLOOD PRESSURE: 81 MMHG | TEMPERATURE: 97.9 F | SYSTOLIC BLOOD PRESSURE: 118 MMHG | OXYGEN SATURATION: 100 % | HEART RATE: 74 BPM | HEIGHT: 62 IN | BODY MASS INDEX: 37.73 KG/M2

## 2021-08-13 PROCEDURE — 99239 HOSP IP/OBS DSCHRG MGMT >30: CPT | Performed by: PSYCHIATRY & NEUROLOGY

## 2021-08-13 PROCEDURE — 74011250637 HC RX REV CODE- 250/637: Performed by: EMERGENCY MEDICINE

## 2021-08-13 RX ORDER — HYDRALAZINE HYDROCHLORIDE 25 MG/1
25 TABLET, FILM COATED ORAL 3 TIMES DAILY
Qty: 90 TABLET | Refills: 0 | Status: SHIPPED | OUTPATIENT
Start: 2021-08-13

## 2021-08-13 RX ADMIN — HYDRALAZINE HYDROCHLORIDE 25 MG: 25 TABLET, FILM COATED ORAL at 08:13

## 2021-08-13 NOTE — DISCHARGE INSTRUCTIONS
BEHAVIORAL HEALTH NURSING DISCHARGE NOTE      The following personal items collected during your admission are returned to you:   Dental Appliance: Dental Appliances: None  Vision: Visual Aid: Sent home, Glasses  Hearing Aid:    Jewelry: Jewelry: None  Clothing: Clothing: Footwear, Shirt, Shorts  Other Valuables: Other Valuables: Eyeglasses, [address] [address] sent to safe: Personal Items Sent to Safe: wallet w/ some cash value      PATIENT INSTRUCTIONS:             The discharge information has been reviewed with the patient. The patient verbalized understanding.         Patient armband removed and shredded

## 2021-08-13 NOTE — BH NOTES
Patient appeared to sleep 5 hours she sat up in several times during the night. Patient respirations appeared normal while asleep.
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
## 2021-08-26 ENCOUNTER — HOSPITAL ENCOUNTER (EMERGENCY)
Age: 51
Discharge: HOME | End: 2021-08-26
Attending: EMERGENCY MEDICINE
Payer: MEDICAID

## 2021-08-26 VITALS
SYSTOLIC BLOOD PRESSURE: 154 MMHG | DIASTOLIC BLOOD PRESSURE: 94 MMHG | OXYGEN SATURATION: 100 % | TEMPERATURE: 99.6 F | HEART RATE: 89 BPM | RESPIRATION RATE: 16 BRPM

## 2021-08-26 DIAGNOSIS — L03.115 CELLULITIS OF RIGHT LOWER EXTREMITY: Primary | ICD-10-CM

## 2021-08-26 PROCEDURE — 99282 EMERGENCY DEPT VISIT SF MDM: CPT

## 2021-08-26 RX ORDER — CETIRIZINE HCL 10 MG
10 TABLET ORAL
Qty: 10 TABLET | Refills: 0 | Status: SHIPPED | OUTPATIENT
Start: 2021-08-26 | End: 2021-09-05

## 2021-08-26 RX ORDER — CEPHALEXIN 500 MG/1
500 CAPSULE ORAL 4 TIMES DAILY
Qty: 28 CAPSULE | Refills: 0 | Status: SHIPPED | OUTPATIENT
Start: 2021-08-26 | End: 2021-09-02

## 2021-08-26 NOTE — ED PROVIDER NOTES
EMERGENCY DEPARTMENT HISTORY AND PHYSICAL EXAM      Date: August 26, 2021  Patient Name: Leonard Lawler    History of Presenting Illness     Chief Complaint   Patient presents with    Leg Pain    Leg Swelling       History Provided By: Patient    Chief Complaint: Right leg pain and swelling    Additional History (Context): Leonard Lawler is a 49 y.o. female who presents with right leg pain and swelling since yesterday. Patient notes that she shaved her legs yesterday, later in the day noted that her skin was irritated and throughout the day became slightly warm and irritated. This morning and throughout the day the redness is continued to get a little bit worse and she had a mild subjective fever. She has mild pain that is aching, no radiation, worse with walking and pressure at that location, better at rest and when elevated. Patient notes that she had been bitten by by a rattlesnake once in the past, was concerned this could have been the possibility today, however she has not seen any snakes around her house and she has not spent any significant amount of time outdoors and she does not have a specific recollection of the bitten by a snake or any other animal or insect recently. PCP: None    Current Outpatient Medications   Medication Sig Dispense Refill    cephALEXin (Keflex) 500 mg capsule Take 1 Capsule by mouth four (4) times daily for 7 days. 28 Capsule 0    cetirizine (ZYRTEC) 10 mg tablet Take 1 Tablet by mouth nightly for 10 days. Indications: leg redness and swelling and itching 10 Tablet 0    hydrALAZINE (APRESOLINE) 25 mg tablet Take 1 Tablet by mouth three (3) times daily. Indications: high blood pressure 90 Tablet 0    risperiDONE (RisperDAL) 2 mg tablet Take 1 Tablet by mouth nightly. Indications: schizophrenia 30 Tablet 0       Past History     Past Medical History:  No past medical history on file. Past Surgical History:  No past surgical history on file.     Family History:  No family history on file. Social History:  Social History     Tobacco Use    Smoking status: Not on file   Substance Use Topics    Alcohol use: Not on file    Drug use: Not on file       Allergies: Allergies   Allergen Reactions    Pcn [Penicillins] Unknown (comments)    Zoloft [Sertraline] Other (comments)     \"catatonic reaction\". Review of Systems   Review of Systems   Constitutional: Positive for fatigue. Negative for chills and fever. HENT: Negative for congestion, rhinorrhea, sore throat and trouble swallowing. Eyes: Negative for discharge, redness and itching. Respiratory: Negative for cough, shortness of breath, wheezing and stridor. Cardiovascular: Negative for chest pain, palpitations and leg swelling. Gastrointestinal: Negative for abdominal pain, blood in stool, diarrhea, nausea and vomiting. Genitourinary: Negative for decreased urine volume, difficulty urinating, dysuria, flank pain and frequency. Musculoskeletal: Negative for back pain, myalgias, neck pain and neck stiffness. Skin: Positive for color change and rash. Negative for wound. Neurological: Negative for syncope and light-headedness. Psychiatric/Behavioral: Negative for behavioral problems, confusion, self-injury, sleep disturbance and suicidal ideas. All other systems reviewed and are negative. Physical Exam   There were no vitals filed for this visit. Physical Exam  Vitals and nursing note reviewed. Constitutional:       General: She is not in acute distress. Appearance: She is well-developed. She is obese. She is not ill-appearing or diaphoretic. HENT:      Head: Normocephalic and atraumatic. Nose: Nose normal.      Mouth/Throat:      Mouth: Mucous membranes are moist.      Pharynx: Oropharynx is clear. Eyes:      Extraocular Movements: Extraocular movements intact. Conjunctiva/sclera: Conjunctivae normal.      Pupils: Pupils are equal, round, and reactive to light. Cardiovascular:      Rate and Rhythm: Normal rate and regular rhythm. Heart sounds: Normal heart sounds. Pulmonary:      Effort: Pulmonary effort is normal.      Breath sounds: Normal breath sounds. No wheezing or rales. Abdominal:      General: Bowel sounds are normal. There is no distension. Palpations: Abdomen is soft. Tenderness: There is no abdominal tenderness. Musculoskeletal:         General: Normal range of motion. Cervical back: Normal range of motion and neck supple. Lymphadenopathy:      Cervical: No cervical adenopathy. Skin:     General: Skin is warm and dry. Capillary Refill: Capillary refill takes less than 2 seconds. Findings: Erythema, lesion and rash present. No bruising. Comments: Right anterior shin from just above the ankle extending to the mid anterior shin has an area of erythema and mild warmth, it does jayna, there is no fluctuance, neurovascular intact distally. Neurological:      General: No focal deficit present. Mental Status: She is alert and oriented to person, place, and time. Psychiatric:         Behavior: Behavior normal.           Diagnostic Study Results     Labs -   No results found for this or any previous visit (from the past 12 hour(s)). Radiologic Studies -   No orders to display     CT Results  (Last 48 hours)    None        CXR Results  (Last 48 hours)    None            Medical Decision Making   I am the first provider for this patient. I reviewed the vital signs, available nursing notes, past medical history, past surgical history, family history and social history. Vital Signs-Reviewed the patient's vital signs. Records Reviewed: Nursing Notes and Old Medical Records    ED Course:   Remained stable during her emergency department stay    Disposition:  Discharge    DISCHARGE NOTE:     Pt has been reexamined. Patient has no new complaints, changes, or physical findings.   Care plan outlined and precautions discussed. All medications were reviewed with the patient; will d/c home with Keflex and Zyrtec. All of pt's questions and concerns were addressed. Patient was instructed and agrees to follow up with her primary care provider, as well as to return to the ED upon further deterioration. Patient is ready to go home. Follow-up Information     Follow up With Specialties Details Why Contact Info    your primary care provider  Call in 2 days As needed, If symptoms worsen     [facility] EMERGENCY DEPT Emergency Medicine  As needed, If symptoms worsen [address]  [phone]          Current Discharge Medication List      START taking these medications    Details   cephALEXin (Keflex) 500 mg capsule Take 1 Capsule by mouth four (4) times daily for 7 days. Qty: 28 Capsule, Refills: 0  Start date: 8/26/2021, End date: 9/2/2021      cetirizine (ZYRTEC) 10 mg tablet Take 1 Tablet by mouth nightly for 10 days. Indications: leg redness and swelling and itching  Qty: 10 Tablet, Refills: 0  Start date: 8/26/2021, End date: 9/5/2021         CONTINUE these medications which have NOT CHANGED    Details   hydrALAZINE (APRESOLINE) 25 mg tablet Take 1 Tablet by mouth three (3) times daily. Indications: high blood pressure  Qty: 90 Tablet, Refills: 0  Start date: 8/13/2021      risperiDONE (RisperDAL) 2 mg tablet Take 1 Tablet by mouth nightly. Indications: schizophrenia  Qty: 30 Tablet, Refills: 0  Start date: 8/12/2021             Provider Notes (Medical Decision Making):   Right leg pain and swelling that is consistent with cellulitis. She does not have any evidence of abscess nor of DVT nor of other acute life threat. Will cover with Keflex, as well as Zyrtec for symptomatic care, return precautions, close outpatient PCP follow-up. Diagnosis     Clinical Impression:   1.  Cellulitis of right lower extremity

## 2021-08-26 NOTE — ED TRIAGE NOTES
\"I need to be checked for rattle snake venom. My leg feels like it did when I was previously bitten by a rattle snake. When I was shaving last night there was something on the shaver.

## 2021-09-21 ENCOUNTER — APPOINTMENT (OUTPATIENT)
Dept: GENERAL RADIOLOGY | Age: 51
End: 2021-09-21
Attending: EMERGENCY MEDICINE
Payer: COMMERCIAL

## 2021-09-21 ENCOUNTER — HOSPITAL ENCOUNTER (EMERGENCY)
Age: 51
Discharge: HOME | End: 2021-09-21
Attending: EMERGENCY MEDICINE
Payer: COMMERCIAL

## 2021-09-21 VITALS
RESPIRATION RATE: 18 BRPM | OXYGEN SATURATION: 99 % | HEART RATE: 84 BPM | TEMPERATURE: 97.6 F | SYSTOLIC BLOOD PRESSURE: 138 MMHG | DIASTOLIC BLOOD PRESSURE: 99 MMHG | BODY MASS INDEX: 37.17 KG/M2 | WEIGHT: 202 LBS | HEIGHT: 62 IN

## 2021-09-21 DIAGNOSIS — R06.02 SOB (SHORTNESS OF BREATH): Primary | ICD-10-CM

## 2021-09-21 DIAGNOSIS — E87.6 HYPOKALEMIA: ICD-10-CM

## 2021-09-21 DIAGNOSIS — R05.9 COUGH: ICD-10-CM

## 2021-09-21 LAB
ALBUMIN SERPL-MCNC: 3.8 G/DL (ref 3.4–5)
ALBUMIN/GLOB SERPL: 1 {RATIO} (ref 0.8–1.7)
ALP SERPL-CCNC: 79 U/L (ref 45–117)
ALT SERPL-CCNC: 21 U/L (ref 13–56)
ANION GAP SERPL CALC-SCNC: 6 MMOL/L (ref 3–18)
AST SERPL-CCNC: 17 U/L (ref 10–38)
BASOPHILS # BLD: 0.1 K/UL (ref 0–0.1)
BASOPHILS NFR BLD: 1 % (ref 0–2)
BILIRUB SERPL-MCNC: 0.5 MG/DL (ref 0.2–1)
BNP SERPL-MCNC: 14 PG/ML (ref 0–900)
BUN SERPL-MCNC: 8 MG/DL (ref 7–18)
BUN/CREAT SERPL: 11 (ref 12–20)
CALCIUM SERPL-MCNC: 8.9 MG/DL (ref 8.5–10.1)
CHLORIDE SERPL-SCNC: 105 MMOL/L (ref 100–111)
CK MB CFR SERPL CALC: 1.6 % (ref 0–4)
CK MB SERPL-MCNC: 3 NG/ML (ref 5–25)
CK SERPL-CCNC: 184 U/L (ref 26–192)
CO2 SERPL-SCNC: 28 MMOL/L (ref 21–32)
CREAT SERPL-MCNC: 0.7 MG/DL (ref 0.6–1.3)
DIFFERENTIAL METHOD BLD: ABNORMAL
EOSINOPHIL # BLD: 0.2 K/UL (ref 0–0.4)
EOSINOPHIL NFR BLD: 3 % (ref 0–5)
ERYTHROCYTE [DISTWIDTH] IN BLOOD BY AUTOMATED COUNT: 15.9 % (ref 11.6–14.5)
GLOBULIN SER CALC-MCNC: 3.9 G/DL (ref 2–4)
GLUCOSE SERPL-MCNC: 145 MG/DL (ref 74–99)
HCT VFR BLD AUTO: 40.7 % (ref 35–45)
HGB BLD-MCNC: 13.2 G/DL (ref 12–16)
LYMPHOCYTES # BLD: 1.7 K/UL (ref 0.9–3.6)
LYMPHOCYTES NFR BLD: 23 % (ref 21–52)
MAGNESIUM SERPL-MCNC: 2.1 MG/DL (ref 1.6–2.6)
MCH RBC QN AUTO: 26.2 PG (ref 24–34)
MCHC RBC AUTO-ENTMCNC: 32.4 G/DL (ref 31–37)
MCV RBC AUTO: 80.9 FL (ref 78–100)
MONOCYTES # BLD: 0.4 K/UL (ref 0.05–1.2)
MONOCYTES NFR BLD: 6 % (ref 3–10)
NEUTS SEG # BLD: 4.8 K/UL (ref 1.8–8)
NEUTS SEG NFR BLD: 68 % (ref 40–73)
PLATELET # BLD AUTO: 237 K/UL (ref 135–420)
PMV BLD AUTO: 8.8 FL (ref 9.2–11.8)
POTASSIUM SERPL-SCNC: 2.9 MMOL/L (ref 3.5–5.5)
PROT SERPL-MCNC: 7.7 G/DL (ref 6.4–8.2)
RBC # BLD AUTO: 5.03 M/UL (ref 4.2–5.3)
SODIUM SERPL-SCNC: 139 MMOL/L (ref 136–145)
TROPONIN I SERPL-MCNC: <0.02 NG/ML (ref 0–0.04)
WBC # BLD AUTO: 7.1 K/UL (ref 4.6–13.2)

## 2021-09-21 PROCEDURE — 99282 EMERGENCY DEPT VISIT SF MDM: CPT

## 2021-09-21 PROCEDURE — 83735 ASSAY OF MAGNESIUM: CPT

## 2021-09-21 PROCEDURE — 71046 X-RAY EXAM CHEST 2 VIEWS: CPT

## 2021-09-21 PROCEDURE — 85025 COMPLETE CBC W/AUTO DIFF WBC: CPT

## 2021-09-21 PROCEDURE — 82553 CREATINE MB FRACTION: CPT

## 2021-09-21 PROCEDURE — 83880 ASSAY OF NATRIURETIC PEPTIDE: CPT

## 2021-09-21 PROCEDURE — 93005 ELECTROCARDIOGRAM TRACING: CPT

## 2021-09-21 PROCEDURE — 80053 COMPREHEN METABOLIC PANEL: CPT

## 2021-09-21 RX ORDER — POTASSIUM CHLORIDE 20 MEQ/1
40 TABLET, EXTENDED RELEASE ORAL
Status: DISCONTINUED | OUTPATIENT
Start: 2021-09-21 | End: 2021-09-21 | Stop reason: HOSPADM

## 2021-09-21 NOTE — ED TRIAGE NOTES
Patient states she is having trouble breathing,  Pt is in no distress, respirations even and unlabored. Pt able to talk nto complete sentences. Pt coughing in triage.   States she started coughing about 10 minutes ago

## 2021-09-21 NOTE — ED PROVIDER NOTES
EMERGENCY DEPARTMENT HISTORY AND PHYSICAL EXAM  This was created with voice recognition software and transcription errors may be present. 2:35 AM  Date: 9/21/2021  Patient Name: Dilcia Navas    History of Presenting Illness     Chief Complaint:    History Provided By:     HPI: Dilcia Navas is a 49 y.o. female past medical history of hypertension who presents with cough. Patient states earlier tonight she was trying to lay down to go to sleep and felt short of breath and then she started coughing profusely. She denies any chest pain any nausea any vomiting. She is feeling somewhat better now. Does not feel sick. PCP: Dakota Sharma MD      Past History     Past Medical History:  Past Medical History:   Diagnosis Date    Hypertension        Past Surgical History:  No past surgical history on file. Family History:  No family history on file. Social History:  Social History     Tobacco Use    Smoking status: Never Smoker   Substance Use Topics    Alcohol use: Never    Drug use: Not on file       Allergies: Allergies   Allergen Reactions    Pcn [Penicillins] Unknown (comments)    Zoloft [Sertraline] Other (comments)     \"catatonic reaction\". Review of Systems     Review of Systems   All other systems reviewed and are negative. 10 point review of systems otherwise negative unless noted in HPI. Physical Exam       Physical Exam  Constitutional:       Appearance: She is well-developed. HENT:      Head: Normocephalic and atraumatic. Eyes:      Pupils: Pupils are equal, round, and reactive to light. Cardiovascular:      Rate and Rhythm: Normal rate and regular rhythm. Heart sounds: Normal heart sounds. No murmur heard. No friction rub. Pulmonary:      Effort: Pulmonary effort is normal. No respiratory distress. Breath sounds: Normal breath sounds. No wheezing. Abdominal:      General: There is no distension. Palpations: Abdomen is soft. Tenderness: There is no abdominal tenderness. There is no guarding or rebound. Musculoskeletal:         General: Normal range of motion. Cervical back: Normal range of motion and neck supple. Skin:     General: Skin is warm and dry. Neurological:      Mental Status: She is alert and oriented to person, place, and time. Psychiatric:         Behavior: Behavior normal.         Thought Content: Thought content normal.         Diagnostic Study Results     Vital Signs  EKG: EKG shows sinus 84 normal axis normal intervals there is no ST elevation or depression no hypertrophy  Labs:   Imaging: cxr napd    Medical Decision Making     ED Course: Progress Notes, Reevaluation, and Consults:    I will be the provider of record for this patient. Provider Notes (Medical Decision Making): 41-year-old female presents with shortness of breath exacerbated by coughing now improved this started initially when she was trying to lay down. Will check out for CHF check an x-ray EKG and reassess     labs unremarkable. cxr neg, pt feeling better, will d/c    Diagnosis     Clinical Impression: No diagnosis found. Disposition:        Patient's Medications   Start Taking    No medications on file   Continue Taking    HYDRALAZINE (APRESOLINE) 25 MG TABLET    Take 1 Tablet by mouth three (3) times daily. Indications: high blood pressure    RISPERIDONE (RISPERDAL) 2 MG TABLET    Take 1 Tablet by mouth nightly.  Indications: schizophrenia   These Medications have changed    No medications on file   Stop Taking    No medications on file

## 2021-09-23 LAB
ATRIAL RATE: 84 BPM
CALCULATED P AXIS, ECG09: 44 DEGREES
CALCULATED R AXIS, ECG10: -2 DEGREES
CALCULATED T AXIS, ECG11: 30 DEGREES
DIAGNOSIS, 93000: NORMAL
P-R INTERVAL, ECG05: 152 MS
Q-T INTERVAL, ECG07: 378 MS
QRS DURATION, ECG06: 78 MS
QTC CALCULATION (BEZET), ECG08: 446 MS
VENTRICULAR RATE, ECG03: 84 BPM

## 2022-10-31 ENCOUNTER — OFFICE VISIT (OUTPATIENT)
Dept: URBAN - NONMETROPOLITAN AREA CLINIC 4 | Facility: CLINIC | Age: 52
End: 2022-10-31

## 2022-11-03 ENCOUNTER — OFFICE VISIT (OUTPATIENT)
Dept: URBAN - NONMETROPOLITAN AREA CLINIC 4 | Facility: CLINIC | Age: 52
End: 2022-11-03

## 2022-11-03 RX ORDER — PANTOPRAZOLE SODIUM 40 MG/1
TAKE 1 TABLET (40 MG) BY ORAL ROUTE ONCE DAILY TABLET, DELAYED RELEASE ORAL 1
Qty: 0 | Refills: 0 | COMMUNITY

## 2022-11-03 RX ORDER — GABAPENTIN 300 MG/1
CAPSULE ORAL
Qty: 0 | Refills: 0 | COMMUNITY

## 2022-11-03 RX ORDER — VERAPAMIL HYDROCHLORIDE 120 MG/1
TABLET, FILM COATED ORAL
Qty: 0 | Refills: 0 | COMMUNITY

## 2022-11-03 RX ORDER — CLONAZEPAM 1 MG/1
TABLET ORAL
Qty: 0 | Refills: 0 | COMMUNITY

## 2022-11-03 RX ORDER — TOPIRAMATE 50 MG/1
TABLET, FILM COATED ORAL
Qty: 0 | Refills: 0 | COMMUNITY

## 2022-11-03 RX ORDER — ELETRIPTAN HYDROBROMIDE 40 MG/1
TABLET, FILM COATED ORAL
Qty: 0 | Refills: 0 | COMMUNITY

## 2022-11-29 ENCOUNTER — OFFICE VISIT (OUTPATIENT)
Dept: URBAN - NONMETROPOLITAN AREA CLINIC 4 | Facility: CLINIC | Age: 52
End: 2022-11-29

## 2022-11-29 RX ORDER — PANTOPRAZOLE SODIUM 40 MG/1
TAKE 1 TABLET (40 MG) BY ORAL ROUTE ONCE DAILY TABLET, DELAYED RELEASE ORAL 1
Qty: 0 | Refills: 0 | COMMUNITY

## 2022-11-29 RX ORDER — VERAPAMIL HYDROCHLORIDE 120 MG/1
TABLET, FILM COATED ORAL
Qty: 0 | Refills: 0 | COMMUNITY

## 2022-11-29 RX ORDER — GABAPENTIN 300 MG/1
CAPSULE ORAL
Qty: 0 | Refills: 0 | COMMUNITY

## 2022-11-29 RX ORDER — CLONAZEPAM 1 MG/1
TABLET ORAL
Qty: 0 | Refills: 0 | COMMUNITY

## 2022-11-29 RX ORDER — TOPIRAMATE 50 MG/1
TABLET, FILM COATED ORAL
Qty: 0 | Refills: 0 | COMMUNITY

## 2022-11-29 RX ORDER — ELETRIPTAN HYDROBROMIDE 40 MG/1
TABLET, FILM COATED ORAL
Qty: 0 | Refills: 0 | COMMUNITY

## 2023-04-18 ENCOUNTER — OUT OF OFFICE VISIT (OUTPATIENT)
Dept: URBAN - METROPOLITAN AREA MEDICAL CENTER 10 | Facility: MEDICAL CENTER | Age: 53
End: 2023-04-18

## 2023-04-18 ENCOUNTER — CLAIMS CREATED FROM THE CLAIM WINDOW (OUTPATIENT)
Dept: URBAN - METROPOLITAN AREA MEDICAL CENTER 10 | Facility: MEDICAL CENTER | Age: 53
End: 2023-04-18
Payer: COMMERCIAL

## 2023-04-18 DIAGNOSIS — R10.13 ABDOMINAL DISCOMFORT, EPIGASTRIC: ICD-10-CM

## 2023-04-18 DIAGNOSIS — D64.89 ANEMIA DUE TO OTHER CAUSE: ICD-10-CM

## 2023-04-18 DIAGNOSIS — K92.1 ACUTE MELENA: ICD-10-CM

## 2023-04-18 DIAGNOSIS — R10.11 ABDOMINAL BURNING SENSATION IN RIGHT UPPER QUADRANT: ICD-10-CM

## 2023-04-18 PROCEDURE — 99254 IP/OBS CNSLTJ NEW/EST MOD 60: CPT | Performed by: INTERNAL MEDICINE

## 2023-04-18 PROCEDURE — 99232 SBSQ HOSP IP/OBS MODERATE 35: CPT | Performed by: INTERNAL MEDICINE

## 2023-04-19 ENCOUNTER — CLAIMS CREATED FROM THE CLAIM WINDOW (OUTPATIENT)
Dept: URBAN - METROPOLITAN AREA MEDICAL CENTER 10 | Facility: MEDICAL CENTER | Age: 53
End: 2023-04-19

## 2023-04-19 ENCOUNTER — CLAIMS CREATED FROM THE CLAIM WINDOW (OUTPATIENT)
Dept: URBAN - METROPOLITAN AREA MEDICAL CENTER 10 | Facility: MEDICAL CENTER | Age: 53
End: 2023-04-19
Payer: COMMERCIAL

## 2023-04-19 DIAGNOSIS — K29.60 ADENOPAPILLOMATOSIS GASTRICA: ICD-10-CM

## 2023-04-19 DIAGNOSIS — K21.00 ALKALINE REFLUX ESOPHAGITIS: ICD-10-CM

## 2023-04-19 PROCEDURE — 43239 EGD BIOPSY SINGLE/MULTIPLE: CPT | Performed by: INTERNAL MEDICINE

## 2023-10-07 ENCOUNTER — P2P PATIENT RECORD (OUTPATIENT)
Age: 53
End: 2023-10-07